# Patient Record
Sex: FEMALE | Race: BLACK OR AFRICAN AMERICAN | Employment: FULL TIME | URBAN - METROPOLITAN AREA
[De-identification: names, ages, dates, MRNs, and addresses within clinical notes are randomized per-mention and may not be internally consistent; named-entity substitution may affect disease eponyms.]

---

## 2017-02-17 ENCOUNTER — HOSPITAL ENCOUNTER (EMERGENCY)
Age: 27
Discharge: HOME | End: 2017-02-18
Attending: EMERGENCY MEDICINE
Payer: COMMERCIAL

## 2017-02-17 DIAGNOSIS — R11.0 NAUSEA ALONE: ICD-10-CM

## 2017-02-17 DIAGNOSIS — K59.00 CONSTIPATION, UNSPECIFIED CONSTIPATION TYPE: ICD-10-CM

## 2017-02-17 DIAGNOSIS — J06.9 ACUTE UPPER RESPIRATORY INFECTION: Primary | ICD-10-CM

## 2017-02-17 DIAGNOSIS — R10.13 ACUTE EPIGASTRIC PAIN: ICD-10-CM

## 2017-02-17 PROCEDURE — 99284 EMERGENCY DEPT VISIT MOD MDM: CPT | Performed by: EMERGENCY MEDICINE

## 2017-02-17 PROCEDURE — 87880 STREP A ASSAY W/OPTIC: CPT | Performed by: EMERGENCY MEDICINE

## 2017-02-17 PROCEDURE — 87804 INFLUENZA ASSAY W/OPTIC: CPT | Performed by: EMERGENCY MEDICINE

## 2017-02-17 PROCEDURE — 87081 CULTURE SCREEN ONLY: CPT | Performed by: EMERGENCY MEDICINE

## 2017-02-17 PROCEDURE — 81003 URINALYSIS AUTO W/O SCOPE: CPT | Performed by: EMERGENCY MEDICINE

## 2017-02-17 NOTE — LETTER
[address] [facility] EMERGENCY DEPT One [address] [address] 
[phone] Work/School Note Date: 2/17/2017 To Whom It May concern: 
 
Mulugeta Abad was seen and treated today in the emergency room by the following provider(s): 
Attending Provider: Tenzin Elizabeth MD. Mulugeta Abad may return to work on 02/19/2017 & when fever free for 24 hours.  
 
Sincerely, 
 
 
 
 
Bradley Santamaria RN

## 2017-02-18 VITALS
BODY MASS INDEX: 32.14 KG/M2 | HEIGHT: 66 IN | RESPIRATION RATE: 16 BRPM | SYSTOLIC BLOOD PRESSURE: 131 MMHG | HEART RATE: 83 BPM | DIASTOLIC BLOOD PRESSURE: 52 MMHG | WEIGHT: 200 LBS | OXYGEN SATURATION: 100 % | TEMPERATURE: 98.4 F

## 2017-02-18 LAB
ALBUMIN SERPL BCP-MCNC: 3.6 G/DL (ref 3.5–5)
ALBUMIN/GLOB SERPL: 0.9 {RATIO} (ref 1.2–3.5)
ALP SERPL-CCNC: 54 U/L (ref 50–136)
ALT SERPL-CCNC: 25 U/L (ref 12–65)
ANION GAP BLD CALC-SCNC: 7 MMOL/L (ref 7–16)
AST SERPL W P-5'-P-CCNC: 16 U/L (ref 15–37)
BASOPHILS # BLD AUTO: 0 K/UL (ref 0–0.2)
BASOPHILS # BLD: 0 % (ref 0–2)
BILIRUB SERPL-MCNC: 0.2 MG/DL (ref 0.2–1.1)
BUN SERPL-MCNC: 7 MG/DL (ref 6–23)
CALCIUM SERPL-MCNC: 9.2 MG/DL (ref 8.3–10.4)
CHLORIDE SERPL-SCNC: 106 MMOL/L (ref 98–107)
CO2 SERPL-SCNC: 29 MMOL/L (ref 21–32)
CREAT SERPL-MCNC: 0.8 MG/DL (ref 0.6–1)
DEPRECATED S PYO AG THROAT QL EIA: NEGATIVE
DIFFERENTIAL METHOD BLD: ABNORMAL
EOSINOPHIL # BLD: 0.2 K/UL (ref 0–0.8)
EOSINOPHIL NFR BLD: 2 % (ref 0.5–7.8)
ERYTHROCYTE [DISTWIDTH] IN BLOOD BY AUTOMATED COUNT: 14 % (ref 11.9–14.6)
FLUAV AG NPH QL IA: NEGATIVE
FLUBV AG NPH QL IA: NEGATIVE
GLOBULIN SER CALC-MCNC: 4 G/DL (ref 2.3–3.5)
GLUCOSE SERPL-MCNC: 86 MG/DL (ref 65–100)
HCG UR QL: NEGATIVE
HCT VFR BLD AUTO: 36.4 % (ref 35.8–46.3)
HETEROPH AB SER QL: NEGATIVE
HGB BLD-MCNC: 11.5 G/DL (ref 11.7–15.4)
IMM GRANULOCYTES # BLD: 0.1 K/UL (ref 0–0.5)
IMM GRANULOCYTES NFR BLD AUTO: 0.6 % (ref 0–5)
LIPASE SERPL-CCNC: 145 U/L (ref 73–393)
LYMPHOCYTES # BLD AUTO: 29 % (ref 13–44)
LYMPHOCYTES # BLD: 2.6 K/UL (ref 0.5–4.6)
MCH RBC QN AUTO: 25.3 PG (ref 26.1–32.9)
MCHC RBC AUTO-ENTMCNC: 31.6 G/DL (ref 31.4–35)
MCV RBC AUTO: 80.2 FL (ref 79.6–97.8)
MONOCYTES # BLD: 0.7 K/UL (ref 0.1–1.3)
MONOCYTES NFR BLD AUTO: 8 % (ref 4–12)
NEUTS SEG # BLD: 5.4 K/UL (ref 1.7–8.2)
NEUTS SEG NFR BLD AUTO: 60 % (ref 43–78)
PLATELET # BLD AUTO: 480 K/UL (ref 150–450)
PMV BLD AUTO: 9.1 FL (ref 10.8–14.1)
POTASSIUM SERPL-SCNC: 3.8 MMOL/L (ref 3.5–5.1)
PROT SERPL-MCNC: 7.6 G/DL (ref 6.3–8.2)
RBC # BLD AUTO: 4.54 M/UL (ref 4.05–5.25)
SODIUM SERPL-SCNC: 142 MMOL/L (ref 136–145)
WBC # BLD AUTO: 9 K/UL (ref 4.3–11.1)

## 2017-02-18 PROCEDURE — 74011250637 HC RX REV CODE- 250/637: Performed by: EMERGENCY MEDICINE

## 2017-02-18 PROCEDURE — 86308 HETEROPHILE ANTIBODY SCREEN: CPT | Performed by: EMERGENCY MEDICINE

## 2017-02-18 PROCEDURE — 80053 COMPREHEN METABOLIC PANEL: CPT | Performed by: EMERGENCY MEDICINE

## 2017-02-18 PROCEDURE — 85025 COMPLETE CBC W/AUTO DIFF WBC: CPT | Performed by: EMERGENCY MEDICINE

## 2017-02-18 PROCEDURE — 81025 URINE PREGNANCY TEST: CPT

## 2017-02-18 PROCEDURE — 83690 ASSAY OF LIPASE: CPT | Performed by: EMERGENCY MEDICINE

## 2017-02-18 RX ORDER — HYDROCODONE BITARTRATE AND HOMATROPINE METHYLBROMIDE ORAL SOLUTION 5; 1.5 MG/5ML; MG/5ML
5 LIQUID ORAL
Qty: 60 ML | Refills: 0 | Status: SHIPPED | OUTPATIENT
Start: 2017-02-18 | End: 2017-02-21 | Stop reason: ALTCHOICE

## 2017-02-18 RX ADMIN — Medication 30 ML: at 00:58

## 2017-02-18 NOTE — ED TRIAGE NOTES
PT arrived to ED c/o a cold symptoms. PT states she was seen at the ER last week and was negative for sore throat. PT states she has been nauseous and unable to eat.

## 2017-02-18 NOTE — DISCHARGE INSTRUCTIONS
Tylenol or ibuprofen for any aches or fever. Extra fluids. Saline gargles or Chloraseptic. Liquid medication to help with cough and achiness and congestion. He can make constipation worse so consider using Dulcolax as well as a stool softener. Recheck with her doctor 2-3 days if not improving. Recheck sooner for worse pain/fever/vomiting/breathing         Abdominal Pain: Care Instructions  Your Care Instructions    Abdominal pain has many possible causes. Some aren't serious and get better on their own in a few days. Others need more testing and treatment. If your pain continues or gets worse, you need to be rechecked and may need more tests to find out what is wrong. You may need surgery to correct the problem. Don't ignore new symptoms, such as fever, nausea and vomiting, urination problems, pain that gets worse, and dizziness. These may be signs of a more serious problem. Your doctor may have recommended a follow-up visit in the next 8 to 12 hours. If you are not getting better, you may need more tests or treatment. The doctor has checked you carefully, but problems can develop later. If you notice any problems or new symptoms, get medical treatment right away. Follow-up care is a key part of your treatment and safety. Be sure to make and go to all appointments, and call your doctor if you are having problems. It's also a good idea to know your test results and keep a list of the medicines you take. How can you care for yourself at home? · Rest until you feel better. · To prevent dehydration, drink plenty of fluids, enough so that your urine is light yellow or clear like water. Choose water and other caffeine-free clear liquids until you feel better. If you have kidney, heart, or liver disease and have to limit fluids, talk with your doctor before you increase the amount of fluids you drink. · If your stomach is upset, eat mild foods, such as rice, dry toast or crackers, bananas, and applesauce. Try eating several small meals instead of two or three large ones. · Wait until 48 hours after all symptoms have gone away before you have spicy foods, alcohol, and drinks that contain caffeine. · Do not eat foods that are high in fat. · Avoid anti-inflammatory medicines such as aspirin, ibuprofen (Advil, Motrin), and naproxen (Aleve). These can cause stomach upset. Talk to your doctor if you take daily aspirin for another health problem. When should you call for help? Call 911 anytime you think you may need emergency care. For example, call if:  · You passed out (lost consciousness). · You pass maroon or very bloody stools. · You vomit blood or what looks like coffee grounds. · You have new, severe belly pain. Call your doctor now or seek immediate medical care if:  · Your pain gets worse, especially if it becomes focused in one area of your belly. · You have a new or higher fever. · Your stools are black and look like tar, or they have streaks of blood. · You have unexpected vaginal bleeding. · You have symptoms of a urinary tract infection. These may include:  ¨ Pain when you urinate. ¨ Urinating more often than usual.  ¨ Blood in your urine. · You are dizzy or lightheaded, or you feel like you may faint. Watch closely for changes in your health, and be sure to contact your doctor if:  · You are not getting better after 1 day (24 hours). Where can you learn more? Go to http://breana-mikhail.info/. Enter S271 in the search box to learn more about \"Abdominal Pain: Care Instructions. \"  Current as of: May 27, 2016  Content Version: 11.1  © 8937-0218 [employer]. Care instructions adapted under license by [employer] (which disclaims liability or warranty for this information).  If you have questions about a medical condition or this instruction, always ask your healthcare professional. [address] any warranty or liability for your use of this information. Constipation: Care Instructions  Your Care Instructions  Constipation means that you have a hard time passing stools (bowel movements). People pass stools from 3 times a day to once every 3 days. What is normal for you may be different. Constipation may occur with pain in the rectum and cramping. The pain may get worse when you try to pass stools. Sometimes there are small amounts of bright red blood on toilet paper or the surface of stools. This is because of enlarged veins near the rectum (hemorrhoids). A few changes in your diet and lifestyle may help you avoid ongoing constipation. Your doctor may also prescribe medicine to help loosen your stool. Some medicines can cause constipation. These include pain medicines and antidepressants. Tell your doctor about all the medicines you take. Your doctor may want to make a medicine change to ease your symptoms. Follow-up care is a key part of your treatment and safety. Be sure to make and go to all appointments, and call your doctor if you are having problems. It's also a good idea to know your test results and keep a list of the medicines you take. How can you care for yourself at home? · Drink plenty of fluids, enough so that your urine is light yellow or clear like water. If you have kidney, heart, or liver disease and have to limit fluids, talk with your doctor before you increase the amount of fluids you drink. · Include high-fiber foods in your diet each day. These include fruits, vegetables, beans, and whole grains. · Get at least 30 minutes of exercise on most days of the week. Walking is a good choice. You also may want to do other activities, such as running, swimming, cycling, or playing tennis or team sports. · Take a fiber supplement, such as Citrucel or Metamucil, every day. Read and follow all instructions on the label. · Schedule time each day for a bowel movement. A daily routine may help.  Take your time having your bowel movement. · Support your feet with a small step stool when you sit on the toilet. This helps flex your hips and places your pelvis in a squatting position. · Your doctor may recommend an over-the-counter laxative to relieve your constipation. Examples are Milk of Magnesia and MiraLax. Read and follow all instructions on the label. Do not use laxatives on a long-term basis. When should you call for help? Call your doctor now or seek immediate medical care if:  · You have new or worse belly pain. · You have new or worse nausea or vomiting. · You have blood in your stools. Watch closely for changes in your health, and be sure to contact your doctor if:  · Your constipation is getting worse. · You do not get better as expected. Where can you learn more? Go to http://breana-mikhail.info/. Enter [phone] in the search box to learn more about \"Constipation: Care Instructions. \"  Current as of: May 27, 2016  Content Version: 11.1  © 1245-7241 [employer]. Care instructions adapted under license by [email or web address] (which disclaims liability or warranty for this information). If you have questions about a medical condition or this instruction, always ask your healthcare professional. [place] [address] any warranty or liability for your use of this information. Sore Throat: Care Instructions  Your Care Instructions    Infection by bacteria or a virus causes most sore throats. Cigarette smoke, dry air, air pollution, allergies, and yelling can also cause a sore throat. Sore throats can be painful and annoying. Fortunately, most sore throats go away on their own. If you have a bacterial infection, your doctor may prescribe antibiotics. Follow-up care is a key part of your treatment and safety. Be sure to make and go to all appointments, and call your doctor if you are having problems.  It's also a good idea to know your test results and keep a list of the medicines you take. How can you care for yourself at home? · If your doctor prescribed antibiotics, take them as directed. Do not stop taking them just because you feel better. You need to take the full course of antibiotics. · Gargle with warm salt water once an hour to help reduce swelling and relieve discomfort. Use 1 teaspoon of salt mixed in 1 cup of warm water. · Take an over-the-counter pain medicine, such as acetaminophen (Tylenol), ibuprofen (Advil, Motrin), or naproxen (Aleve). Read and follow all instructions on the label. · Be careful when taking over-the-counter cold or flu medicines and Tylenol at the same time. Many of these medicines have acetaminophen, which is Tylenol. Read the labels to make sure that you are not taking more than the recommended dose. Too much acetaminophen (Tylenol) can be harmful. · Drink plenty of fluids. Fluids may help soothe an irritated throat. Hot fluids, such as tea or soup, may help decrease throat pain. · Use over-the-counter throat lozenges to soothe pain. Regular cough drops or hard candy may also help. These should not be given to young children because of the risk of choking. · Do not smoke or allow others to smoke around you. If you need help quitting, talk to your doctor about stop-smoking programs and medicines. These can increase your chances of quitting for good. · Use a vaporizer or humidifier to add moisture to your bedroom. Follow the directions for cleaning the machine. When should you call for help? Call your doctor now or seek immediate medical care if:  · You have new or worse trouble swallowing. · Your sore throat gets much worse on one side. Watch closely for changes in your health, and be sure to contact your doctor if you do not get better as expected. Where can you learn more? Go to http://breana-mikhail.info/. Enter [phone] in the search box to learn more about \"Sore Throat: Care Instructions. \"  Current as of: July 29, 2016  Content Version: 11.1  © 5692-7168 [employer]. Care instructions adapted under license by [employer] (which disclaims liability or warranty for this information). If you have questions about a medical condition or this instruction, always ask your healthcare professional. [address] any warranty or liability for your use of this information.

## 2017-02-18 NOTE — ED PROVIDER NOTES
HPI Comments: 78-year-old female with 5 day history of sore throat. Also some URI symptoms congestion. She is also had some epigastric pain and cramping and no bowel movement in the last 5 days. States seen in another emergency department evidently had negative stress test.  She's had nausea but no vomiting. No dysuria    Patient is a 27 y.o. female presenting with sore throat. The history is provided by the patient. Sore Throat    This is a new problem. The current episode started more than 2 days ago. The problem has not changed since onset. Patient reports a subjective fever - was not measured. Associated symptoms include cough. Pertinent negatives include no diarrhea, no vomiting, no ear pain, no headaches and no shortness of breath. She has tried nothing for the symptoms. Past Medical History:   Diagnosis Date    Ill-defined condition      Heart murmur    Prediabetes        No past surgical history on file. No family history on file. Social History     Social History    Marital status: SINGLE     Spouse name: N/A    Number of children: N/A    Years of education: N/A     Occupational History    Not on file. Social History Main Topics    Smoking status: Never Smoker    Smokeless tobacco: Never Used    Alcohol use No    Drug use: No    Sexual activity: Yes     Birth control/ protection: Injection      Comment: depo-provera     Other Topics Concern    Not on file     Social History Narrative         ALLERGIES: Bee sting [sting, bee]    Review of Systems   Constitutional: Positive for chills and fever. HENT: Positive for sore throat. Negative for ear pain. Respiratory: Positive for cough. Negative for shortness of breath. Cardiovascular: Negative for chest pain and palpitations. Gastrointestinal: Positive for abdominal pain, constipation and nausea. Negative for blood in stool, diarrhea and vomiting. Genitourinary: Negative for dysuria and flank pain.    Musculoskeletal: Negative for back pain and neck pain. Skin: Negative for color change and rash. Neurological: Negative for syncope and headaches. All other systems reviewed and are negative. Vitals:    02/17/17 2338   BP: 138/56   Pulse: 95   Resp: 18   Temp: 98.1 °F (36.7 °C)   SpO2: 100%   Weight: 90.7 kg (200 lb)   Height: 5' 6\" (1.676 m)            Physical Exam   Constitutional: She is oriented to person, place, and time. She appears well-developed and well-nourished. No distress. HENT:   Head: Normocephalic and atraumatic. Mouth/Throat: Posterior oropharyngeal erythema present. No oropharyngeal exudate or posterior oropharyngeal edema. Eyes: Conjunctivae and EOM are normal. Pupils are equal, round, and reactive to light. Neck: Normal range of motion. Neck supple. Cardiovascular: Normal rate, regular rhythm and intact distal pulses. No murmur heard. Pulmonary/Chest: Breath sounds normal. No respiratory distress. Abdominal: Soft. Bowel sounds are normal. She exhibits no mass. There is tenderness in the epigastric area and suprapubic area. There is no rebound, no guarding, no tenderness at McBurney's point and negative Domingo's sign. No hernia. Neurological: She is alert and oriented to person, place, and time. Gait normal.   Nl speech   Skin: Skin is warm and dry. Psychiatric: She has a normal mood and affect. Her speech is normal.   Nursing note and vitals reviewed. MDM  Number of Diagnoses or Management Options  Diagnosis management comments: Symptoms sound viral but we'll screen for influenza and strep. Check urinalysis for UTI. Routine blood work.        Amount and/or Complexity of Data Reviewed  Clinical lab tests: ordered and reviewed    Risk of Complications, Morbidity, and/or Mortality  Presenting problems: moderate  Diagnostic procedures: low  Management options: moderate    Patient Progress  Patient progress: stable    ED Course       Procedures    Results Include:    Recent Results (from the past 24 hour(s))   INFLUENZA A & B AG (RAPID TEST)    Collection Time: 02/17/17 11:40 PM   Result Value Ref Range    Influenza A Ag NEGATIVE  NEG      Influenza B Ag NEGATIVE  NEG     STREP AG SCREEN, GROUP A    Collection Time: 02/17/17 11:40 PM   Result Value Ref Range    Group A Strep Ag ID NEGATIVE  NEG     CBC WITH AUTOMATED DIFF    Collection Time: 02/18/17  1:02 AM   Result Value Ref Range    WBC 9.0 4.3 - 11.1 K/uL    RBC 4.54 4.05 - 5.25 M/uL    HGB 11.5 (L) 11.7 - 15.4 g/dL    HCT 36.4 35.8 - 46.3 %    MCV 80.2 79.6 - 97.8 FL    MCH 25.3 (L) 26.1 - 32.9 PG    MCHC 31.6 31.4 - 35.0 g/dL    RDW 14.0 11.9 - 14.6 %    PLATELET 630 (H) 885 - 450 K/uL    MPV 9.1 (L) 10.8 - 14.1 FL    DF AUTOMATED      NEUTROPHILS 60 43 - 78 %    LYMPHOCYTES 29 13 - 44 %    MONOCYTES 8 4.0 - 12.0 %    EOSINOPHILS 2 0.5 - 7.8 %    BASOPHILS 0 0.0 - 2.0 %    IMMATURE GRANULOCYTES 0.6 0.0 - 5.0 %    ABS. NEUTROPHILS 5.4 1.7 - 8.2 K/UL    ABS. LYMPHOCYTES 2.6 0.5 - 4.6 K/UL    ABS. MONOCYTES 0.7 0.1 - 1.3 K/UL    ABS. EOSINOPHILS 0.2 0.0 - 0.8 K/UL    ABS. BASOPHILS 0.0 0.0 - 0.2 K/UL    ABS. IMM. GRANS. 0.1 0.0 - 0.5 K/UL   METABOLIC PANEL, COMPREHENSIVE    Collection Time: 02/18/17  1:02 AM   Result Value Ref Range    Sodium 142 136 - 145 mmol/L    Potassium 3.8 3.5 - 5.1 mmol/L    Chloride 106 98 - 107 mmol/L    CO2 29 21 - 32 mmol/L    Anion gap 7 7 - 16 mmol/L    Glucose 86 65 - 100 mg/dL    BUN 7 6 - 23 MG/DL    Creatinine 0.80 0.6 - 1.0 MG/DL    GFR est AA >60 >60 ml/min/1.73m2    GFR est non-AA >60 >60 ml/min/1.73m2    Calcium 9.2 8.3 - 10.4 MG/DL    Bilirubin, total 0.2 0.2 - 1.1 MG/DL    ALT (SGPT) 25 12 - 65 U/L    AST (SGOT) 16 15 - 37 U/L    Alk.  phosphatase 54 50 - 136 U/L    Protein, total 7.6 6.3 - 8.2 g/dL    Albumin 3.6 3.5 - 5.0 g/dL    Globulin 4.0 (H) 2.3 - 3.5 g/dL    A-G Ratio 0.9 (L) 1.2 - 3.5     LIPASE    Collection Time: 02/18/17  1:02 AM   Result Value Ref Range    Lipase 145 73 - 393 U/L HCG URINE, QL. - POC    Collection Time: 02/18/17  1:24 AM   Result Value Ref Range    Pregnancy test,urine (POC) NEGATIVE  NEG         UA negative

## 2017-02-18 NOTE — ED NOTES
I have reviewed medications, follow up provider options, and discharge instructions with the patient. The patient verbalized understanding. Copy of discharge information given to patient upon discharge. Prescription(s) given to patient. Patient discharged in no distress. Patient instructed not to drive while under influence of drowsy drugs. Patient ambulatory to waiting area. No questions at this time.

## 2017-02-20 LAB
BACTERIA SPEC CULT: NORMAL
SERVICE CMNT-IMP: NORMAL

## 2017-03-15 PROBLEM — Z30.013 ENCOUNTER FOR INITIAL PRESCRIPTION OF INJECTABLE CONTRACEPTIVE: Status: ACTIVE | Noted: 2017-03-15

## 2017-08-17 ENCOUNTER — HOSPITAL ENCOUNTER (EMERGENCY)
Age: 27
Discharge: HOME | End: 2017-08-17
Attending: EMERGENCY MEDICINE
Payer: SELF-PAY

## 2017-08-17 VITALS
DIASTOLIC BLOOD PRESSURE: 82 MMHG | TEMPERATURE: 98.3 F | WEIGHT: 217 LBS | RESPIRATION RATE: 16 BRPM | HEART RATE: 88 BPM | OXYGEN SATURATION: 100 % | SYSTOLIC BLOOD PRESSURE: 135 MMHG | HEIGHT: 66 IN | BODY MASS INDEX: 34.87 KG/M2

## 2017-08-17 DIAGNOSIS — H00.012 HORDEOLUM EXTERNUM OF RIGHT LOWER EYELID: Primary | ICD-10-CM

## 2017-08-17 PROCEDURE — 99283 EMERGENCY DEPT VISIT LOW MDM: CPT | Performed by: EMERGENCY MEDICINE

## 2017-08-17 NOTE — ED TRIAGE NOTES
Pt arrived ambulatory via POV with c/o R eye swelling and pain X3 days. Pt's mother plucked hair out of lower lid on Monday.

## 2017-08-18 NOTE — ED PROVIDER NOTES
HPI Comments: Patient is a 73[facility] and [facility] of the right lower eyelid for 3 days. She states her mother pulled on eyelash from that area a few days ago. Today the swelling is worse. She denies any vision changes. She denies any drainage or pus discharge. Patient is a 32 y.o. female presenting with eye pain. The history is provided by the patient. Eye Pain    This is a new problem. The current episode started more than 2 days ago. The problem occurs constantly. The problem has been gradually worsening. The right eye is affected. Injury mechanism: pulled an eyelash from the right lower lid. There is no known exposure to pink eye. She does not wear contacts. Associated symptoms include pain. Pertinent negatives include no blurred vision, no decreased vision, no discharge, no foreign body sensation, no photophobia and no eye redness. She has tried nothing for the symptoms. Past Medical History:   Diagnosis Date    Anemia     Chronic pain     Ill-defined condition     Heart murmur    Irritable bowel syndrome with constipation     Overactive bladder     Prediabetes     Prediabetes        Past Surgical History:   Procedure Laterality Date    HX COLONOSCOPY  04/2017    hemorrhoids    HX ENDOSCOPY  04/2017         Family History:   Problem Relation Age of Onset    Hypertension Mother     Asthma Mother     No Known Problems Father     Diabetes Maternal Aunt     Diabetes Paternal Aunt     Diabetes Paternal Grandmother        Social History     Social History    Marital status: SINGLE     Spouse name: N/A    Number of children: N/A    Years of education: N/A     Occupational History    Not on file.      Social History Main Topics    Smoking status: Never Smoker    Smokeless tobacco: Never Used    Alcohol use No    Drug use: No    Sexual activity: Yes     Birth control/ protection: Injection      Comment: depo-provera Other Topics Concern    Not on file     Social History Narrative         ALLERGIES: Bee sting [sting, bee]    Review of Systems   Constitutional: Negative. HENT: Negative. Eyes: Positive for pain. Negative for blurred vision, photophobia, discharge and redness. Respiratory: Negative. Cardiovascular: Negative. Gastrointestinal: Negative. Endocrine: Negative. Genitourinary: Negative. Musculoskeletal: Negative. Vitals:    08/17/17 1909   BP: 143/82   Pulse: 88   Resp: 18   Temp: 98.3 °F (36.8 °C)   SpO2: 99%   Weight: 98.4 kg (217 lb)   Height: 5' 6\" (1.676 m)            Physical Exam   Constitutional: She appears well-developed and well-nourished. HENT:   Head: Normocephalic and atraumatic. Eyes: Conjunctivae and EOM are normal. Pupils are equal, round, and reactive to light. Swelling to the right lower eyelid with mild tenderness   Neck: Normal range of motion. Neck supple. Lymphadenopathy:     She has no cervical adenopathy. Skin: Skin is warm and dry. No rash noted. Nursing note and vitals reviewed. MDM  Number of Diagnoses or Management Options  Diagnosis management comments: Differential diagnosis includes stye, conjunctivitis, abscess    Risk of Complications, Morbidity, and/or Mortality  Presenting problems: minimal  Diagnostic procedures: minimal  Management options: minimal    Patient Progress  Patient progress: stable    ED Course   8:47 PM  I have advised the patient to continue with Tylenol or ibuprofen for pain and apply warm compresses every few hours to the affected area. Voice dictation software was used during the making of this note. This software is not perfect and grammatical and other typographical errors may be present. This note has been proofread, but may still contain errors.   Jamee Arellano MD; 8/17/2017 @8:48 PM   ===================================================================        Procedures

## 2017-08-18 NOTE — DISCHARGE INSTRUCTIONS
Styes and Chalazia: Care Instructions  Your Care Instructions    Styes and chalazia (say \"nob-XFO-fxl-uh\") are both conditions that can cause swelling of the eyelid. A stye is an infection in the root of an eyelash. The infection causes a tender red lump on the edge of the eyelid. The infection can spread until the whole eyelid becomes red and inflamed. Styes usually break open, and a tiny amount of pus drains. They usually clear up on their own in about a week, but they sometimes need treatment with antibiotics. A chalazion is a lump or cyst in the eyelid (chalazion is singular; chalazia is plural). It is caused by swelling and inflammation of deep oil glands inside the eyelid. Chalazia are usually not infected. They can take a few months to heal.  If a chalazion becomes more swollen and painful or does not go away, you may need to have it drained by your doctor. Follow-up care is a key part of your treatment and safety. Be sure to make and go to all appointments, and call your doctor if you are having problems. It's also a good idea to know your test results and keep a list of the medicines you take. How can you care for yourself at home? · Do not rub your eyes. Do not squeeze or try to open a stye or chalazion. · To help a stye or chalazion heal faster:  ¨ Put a warm, moist compress on your eye for 5 to 10 minutes, 3 to 6 times a day. Heat often brings a stye to a point where it drains on its own. Keep in mind that warm compresses will often increase swelling a little at first.  ¨ Do not use hot water or heat a wet cloth in a microwave oven. The compress may get too hot and can burn the eyelid. · Always wash your hands before and after you use a compress or touch your eyes. · If the doctor gave you antibiotic drops or ointment, use the medicine exactly as directed. Use the medicine for as long as instructed, even if your eye starts to feel better.   · To put in eyedrops or ointment:  ¨ Tilt your head back, and pull your lower eyelid down with one finger. ¨ Drop or squirt the medicine inside the lower lid. ¨ Close your eye for 30 to 60 seconds to let the drops or ointment move around. ¨ Do not touch the ointment or dropper tip to your eyelashes or any other surface. · Do not wear eye makeup or contact lenses until the stye or chalazion heals. · Do not share towels, pillows, or washcloths while you have a stye. When should you call for help? Call your doctor now or seek immediate medical care if:  · You have pain in your eye. · You have a change in vision or loss of vision. · Redness and swelling get much worse. Watch closely for changes in your health, and be sure to contact your doctor if:  · Your stye does not get better in 1 week. · Your chalazion does not start to get better after several weeks. Where can you learn more? Go to http://breana-mikhail.info/. Enter F900 in the search box to learn more about \"Styes and Chalazia: Care Instructions. \"  Current as of: March 3, 2017  Content Version: 11.3  © 7355-7129 [employer]. Care instructions adapted under license by [employer] (which disclaims liability or warranty for this information). If you have questions about a medical condition or this instruction, always ask your healthcare professional. [address] any warranty or liability for your use of this information.

## 2019-08-02 ENCOUNTER — HOSPITAL ENCOUNTER (EMERGENCY)
Age: 29
Discharge: HOME | End: 2019-08-02
Attending: EMERGENCY MEDICINE
Payer: SELF-PAY

## 2019-08-02 VITALS
OXYGEN SATURATION: 96 % | SYSTOLIC BLOOD PRESSURE: 166 MMHG | HEART RATE: 103 BPM | TEMPERATURE: 98.4 F | BODY MASS INDEX: 36.65 KG/M2 | HEIGHT: 65 IN | DIASTOLIC BLOOD PRESSURE: 99 MMHG | WEIGHT: 220 LBS | RESPIRATION RATE: 16 BRPM

## 2019-08-02 DIAGNOSIS — T63.484A INSECT STINGS, UNDETERMINED INTENT, INITIAL ENCOUNTER: Primary | ICD-10-CM

## 2019-08-02 PROCEDURE — 74011250637 HC RX REV CODE- 250/637: Performed by: PHYSICIAN ASSISTANT

## 2019-08-02 PROCEDURE — 99283 EMERGENCY DEPT VISIT LOW MDM: CPT | Performed by: PHYSICIAN ASSISTANT

## 2019-08-02 RX ORDER — TRIAMCINOLONE ACETONIDE 1 MG/G
OINTMENT TOPICAL 2 TIMES DAILY
Qty: 80 G | Refills: 0 | Status: SHIPPED | OUTPATIENT
Start: 2019-08-02 | End: 2019-12-17

## 2019-08-02 RX ORDER — METHYLPREDNISOLONE 4 MG/1
4 TABLET ORAL
Qty: 1 DOSE PACK | Refills: 0 | Status: SHIPPED | OUTPATIENT
Start: 2019-08-02 | End: 2019-08-09

## 2019-08-02 RX ORDER — DEXAMETHASONE SODIUM PHOSPHATE 100 MG/10ML
10 INJECTION INTRAMUSCULAR; INTRAVENOUS
Status: COMPLETED | OUTPATIENT
Start: 2019-08-02 | End: 2019-08-02

## 2019-08-02 RX ORDER — MINERAL OIL
180 ENEMA (ML) RECTAL DAILY
Qty: 30 TAB | Refills: 0 | Status: SHIPPED | OUTPATIENT
Start: 2019-08-02 | End: 2019-12-17

## 2019-08-02 RX ADMIN — DEXAMETHASONE SODIUM PHOSPHATE 10 MG: 10 INJECTION INTRAMUSCULAR; INTRAVENOUS at 18:43

## 2019-08-02 NOTE — ED PROVIDER NOTES
Patient states she was at work when she felt something sting her left upper arm. She saw a yellow jacket and swatted it off of her arm. She states it has been burning since then. This was about an hour ago. She did drink some Benadryl and came to the emergency room. She states she had a reaction to something that stung her when she was 8years old and has been stung a couple of times since then but never has had a reaction. She is not having any swelling of her tongue or her throat, trouble breathing, chest pain, rash, dizziness or other new symptoms. She did ambulate to the room without difficulty and is well-hydrated. She is not diabetic. She states it burns. The history is provided by the patient. Bee sting    The incident occurred just prior to arrival. The incident occurred at work. There is an injury to the left upper arm. The pain is mild. Pertinent negatives include no chest pain, no fussiness, no numbness, no visual disturbance, no abdominal pain, no bowel incontinence, no nausea, no vomiting, no bladder incontinence, no headaches, no hearing loss, no inability to bear weight, no neck pain, no pain when bearing weight, no focal weakness, no decreased responsiveness, no light-headedness, no loss of consciousness, no seizures, no tingling, no weakness, no cough, no difficulty breathing and no memory loss. There have been no prior injuries to these areas. She is right-handed. She has been behaving normally. There were no sick contacts.         Past Medical History:   Diagnosis Date    Anemia     Chronic pain     Ill-defined condition     Heart murmur    Irritable bowel syndrome with constipation     Overactive bladder     Prediabetes     Prediabetes        Past Surgical History:   Procedure Laterality Date    HX COLONOSCOPY  04/2017    hemorrhoids    HX ENDOSCOPY  04/2017         Family History:   Problem Relation Age of Onset    Hypertension Mother     Asthma Mother     No Known Problems Father     Diabetes Maternal Aunt     Diabetes Paternal Aunt     Diabetes Paternal Grandmother        Social History     Socioeconomic History    Marital status: SINGLE     Spouse name: Not on file    Number of children: Not on file    Years of education: Not on file    Highest education level: Not on file   Occupational History    Not on file   Social Needs    Financial resource strain: Not on file    Food insecurity:     Worry: Not on file     Inability: Not on file    Transportation needs:     Medical: Not on file     Non-medical: Not on file   Tobacco Use    Smoking status: Never Smoker    Smokeless tobacco: Never Used   Substance and Sexual Activity    Alcohol use: No    Drug use: No    Sexual activity: Yes     Birth control/protection: Injection     Comment: depo-provera   Lifestyle    Physical activity:     Days per week: Not on file     Minutes per session: Not on file    Stress: Not on file   Relationships    Social connections:     Talks on phone: Not on file     Gets together: Not on file     Attends Zoroastrian service: Not on file     Active member of club or organization: Not on file     Attends meetings of clubs or organizations: Not on file     Relationship status: Not on file    Intimate partner violence:     Fear of current or ex partner: Not on file     Emotionally abused: Not on file     Physically abused: Not on file     Forced sexual activity: Not on file   Other Topics Concern    Not on file   Social History Narrative    Not on file         ALLERGIES: Bee sting [sting, bee]    Review of Systems   Constitutional: Negative. Negative for decreased responsiveness. HENT: Negative. Negative for hearing loss. Eyes: Negative. Negative for visual disturbance. Respiratory: Negative. Negative for cough. Cardiovascular: Negative. Negative for chest pain. Gastrointestinal: Negative. Negative for abdominal pain, bowel incontinence, nausea and vomiting. Genitourinary: Negative. Negative for bladder incontinence. Musculoskeletal: Negative. Negative for neck pain. Skin: Positive for wound. Neurological: Negative. Negative for tingling, focal weakness, seizures, loss of consciousness, weakness, light-headedness, numbness and headaches. Psychiatric/Behavioral: Negative. Negative for memory loss. All other systems reviewed and are negative. Vitals:    08/02/19 1816   BP: 156/82   Pulse: (!) 117   Resp: 16   Temp: 98.7 °F (37.1 °C)   SpO2: 96%   Weight: 99.8 kg (220 lb)   Height: 5' 5\" (1.651 m)            Physical Exam   Constitutional: She is oriented to person, place, and time. She appears well-developed and well-nourished. HENT:   Head: Normocephalic and atraumatic. Right Ear: External ear normal.   Left Ear: External ear normal.   Nose: Nose normal.   Mouth/Throat: Oropharynx is clear and moist.   Eyes: Pupils are equal, round, and reactive to light. Conjunctivae and EOM are normal.   Neck: Normal range of motion. Neck supple. Cardiovascular: Normal rate, regular rhythm, normal heart sounds and intact distal pulses. Pulmonary/Chest: Effort normal and breath sounds normal.   Abdominal: Soft. Bowel sounds are normal.   Musculoskeletal: Normal range of motion. Arms:  Neurological: She is alert and oriented to person, place, and time. She has normal reflexes. Skin: Skin is warm and dry. Psychiatric: She has a normal mood and affect. Her behavior is normal. Judgment and thought content normal.   Nursing note and vitals reviewed. MDM  Number of Diagnoses or Management Options  Insect stings, undetermined intent, initial encounter:   Risk of Complications, Morbidity, and/or Mortality  Presenting problems: moderate  Diagnostic procedures: moderate  Management options: moderate    Patient Progress  Patient progress: improved         Procedures  The patient was observed in the ED.     Patient was stung on her left upper arm and is having a localized reaction to that. There is no swelling of her tongue, neck or the arm. Her lungs are clear. I have given her a dose of Decadron here and sent her home with a Medrol Dosepak, Fexofenadine tablets and Kenalog cream to apply to the area. She was given an ice pack. There does not appear to be a systemic reaction at this point. She was asked to follow-up with her primary care physician for recheck. I have written a note for work if needed and she should return to the ED if anything is changing. She is stable for discharge at this time and ambulatory out of the ER without difficulty. I discussed the results of all labs, procedures, radiographs, and treatments with the patient and available family. Treatment plan is agreed upon and the patient is ready for discharge. All voiced understanding of the discharge plan and medication instructions or changes as appropriate. Questions about treatment in the ED were answered. All were encouraged to return should symptoms worsen or new problems develop.

## 2019-08-02 NOTE — ED TRIAGE NOTES
Pt ambulatory to triage without complications. 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 Pt states she swatted off but has some mild swelling and redness to the left bicep. Pt states she took some liquid benadryl, but just took a \"swig\". Pt denies throat swelling or SOB.

## 2019-08-02 NOTE — ED NOTES
I have reviewed discharge instructions with the patient. The patient verbalized understanding. Patient left ED via Discharge Method: ambulatory to Home with family. Opportunity for questions and clarification provided. Patient given 3 scripts. Work note provided. No e-sign. To continue your aftercare when you leave the hospital, you may receive an automated call from our care team to check in on how you are doing. This is a free service and part of our promise to provide the best care and service to meet your aftercare needs.  If you have questions, or wish to unsubscribe from this service please call [phone]. Thank you for Choosing our [facility] Emergency Department.

## 2019-08-02 NOTE — DISCHARGE INSTRUCTIONS
Patient Education        Finish all of the Medrol dose pack, use the cream to the area if needed. Take the fexofenadine daily. Insect Stings and Bites: Care Instructions  Your Care Instructions  Stings and bites from bees, wasps, ants, and other insects often cause pain, swelling, redness, and itching. In some people, especially children, the redness and swelling may be worse. It may extend several inches beyond the affected area. But in most cases, stings and bites don't cause reactions all over the body. If you have had a reaction to an insect sting or bite, you are at risk for a reaction if you get stung or bitten again. Follow-up care is a key part of your treatment and safety. Be sure to make and go to all appointments, and call your doctor if you are having problems. It's also a good idea to know your test results and keep a list of the medicines you take. How can you care for yourself at home? · Do not scratch or rub the skin where the sting or bite occurred. · Put a cold pack or ice cube on the area. Put a thin cloth between the ice and your skin. For some people, a paste of baking soda mixed with a little water helps relieve pain and decrease the reaction. · Take an over-the-counter antihistamine, such as diphenhydramine (Benadryl) or loratadine (Claritin), to relieve swelling, redness, and itching. Calamine lotion or hydrocortisone cream may also help. Do not give antihistamines to your child unless you have checked with the doctor first.  · Be safe with medicines. If your doctor prescribed medicine for your allergy, take it exactly as prescribed. Call your doctor if you think you are having a problem with your medicine. You will get more details on the specific medicines your doctor prescribes. · Your doctor may prescribe a shot of epinephrine to carry with you in case you have a severe reaction. Learn how and when to give yourself the shot, and keep it with you at all times.  Make sure it has not . · Go to the emergency room anytime you have a severe reaction. Go even if you have given yourself epinephrine and are feeling better. Symptoms can come back. When should you call for help? Call 911 anytime you think you may need emergency care. For example, call if:    · You have symptoms of a severe allergic reaction. These may include:  ? Sudden raised, red areas (hives) all over your body. ? Swelling of the throat, mouth, lips, or tongue. ? Trouble breathing. ? Passing out (losing consciousness). Or you may feel very lightheaded or suddenly feel weak, confused, or restless.    Call your doctor now or seek immediate medical care if:    · You have symptoms of an allergic reaction not right at the sting or bite, such as:  ? A rash or small area of hives (raised, red areas on the skin). ? Itching. ? Swelling. ? Belly pain, nausea, or vomiting.     · You have a lot of swelling around the site (such as your entire arm or leg is swollen).     · You have signs of infection, such as:  ? Increased pain, swelling, redness, or warmth around the sting. ? Red streaks leading from the area. ? Pus draining from the sting. ? A fever.    Watch closely for changes in your health, and be sure to contact your doctor if:    · You do not get better as expected. Where can you learn more? Go to http://breana-mikhail.info/. Enter P390 in the search box to learn more about \"Insect Stings and Bites: Care Instructions. \"  Current as of: 2018  Content Version: 12.1  © 2252-7428 [employer]. Care instructions adapted under license by [employer] (which disclaims liability or warranty for this information). If you have questions about a medical condition or this instruction, always ask your healthcare professional. [address] any warranty or liability for your use of this information.

## 2019-08-02 NOTE — LETTER
[address] [facility] EMERGENCY DEPT 
[address] [facility] [address] 
[phone] Work/School Note Date: 8/2/2019 To Whom It May concern: 
 
Roger Alonso was seen and treated today in the emergency room by the following provider(s): 
Attending Provider: Valdemar Carmona MD 
Physician Assistant: KARLI Zeng. Roger Alonso may return to work on 08/04/19. Sincerely, KARLI Malhotra

## 2020-11-11 PROBLEM — E66.01 OBESITY, MORBID (HCC): Status: ACTIVE | Noted: 2020-11-11

## 2020-11-23 ENCOUNTER — TRANSCRIBE ORDER (OUTPATIENT)
Dept: SCHEDULING | Age: 30
End: 2020-11-23

## 2020-11-23 DIAGNOSIS — M54.2 NECK PAIN: Primary | ICD-10-CM

## 2020-11-23 DIAGNOSIS — M79.602 LEFT ARM PAIN: ICD-10-CM

## 2020-11-24 ENCOUNTER — TELEPHONE (OUTPATIENT)
Dept: NUTRITION | Age: 30
End: 2020-11-24

## 2020-11-24 NOTE — TELEPHONE ENCOUNTER
Nutrition Counseling: Contacted pt regarding referral. See notes documented in Nutrition Counseling Referral for details. No further follow-up contact from pt. Will close referral for this office.     [address] [facility]  [phone]

## 2020-12-01 ENCOUNTER — HOSPITAL ENCOUNTER (OUTPATIENT)
Dept: MRI IMAGING | Age: 30
Discharge: HOME | End: 2020-12-01
Attending: PHYSICAL MEDICINE & REHABILITATION

## 2020-12-01 DIAGNOSIS — M79.602 LEFT ARM PAIN: ICD-10-CM

## 2020-12-01 DIAGNOSIS — M54.2 NECK PAIN: ICD-10-CM

## 2020-12-02 NOTE — PROGRESS NOTES
Patient unable to tolerate MRI due to claustrophobia, will reschedule after further consult with physician

## 2021-02-11 PROBLEM — M54.50 LOWER BACK PAIN: Status: ACTIVE | Noted: 2019-03-08

## 2021-03-02 PROBLEM — Z79.899 ENCOUNTER FOR MEDICATION MANAGEMENT: Status: ACTIVE | Noted: 2021-03-02

## 2021-03-02 PROBLEM — R20.0 NUMBNESS AND TINGLING OF BOTH FEET: Status: ACTIVE | Noted: 2021-03-02

## 2021-03-02 PROBLEM — R29.898 WEAKNESS OF BOTH LEGS: Status: ACTIVE | Noted: 2021-03-02

## 2021-03-02 PROBLEM — R20.2 NUMBNESS AND TINGLING OF BOTH FEET: Status: ACTIVE | Noted: 2021-03-02

## 2021-03-05 ENCOUNTER — APPOINTMENT (OUTPATIENT)
Dept: NUTRITION | Age: 31
End: 2021-03-05
Attending: PHYSICIAN ASSISTANT

## 2021-03-25 ENCOUNTER — TELEPHONE (OUTPATIENT)
Dept: NUTRITION | Age: 31
End: 2021-03-25

## 2021-03-25 NOTE — TELEPHONE ENCOUNTER
Nutrition Counseling: Contacted pt regarding referral. See notes documented in Nutrition Counseling Referral for details. No further follow-up contact from pt. Will close referral for this office.     [address] [facility]  [phone]

## 2021-04-13 PROBLEM — R20.2 PARESTHESIA OF BOTH HANDS: Status: ACTIVE | Noted: 2021-04-13

## 2021-11-11 PROBLEM — M25.541 JOINT PAIN IN FINGERS OF BOTH HANDS: Status: ACTIVE | Noted: 2021-11-11

## 2021-11-11 PROBLEM — M25.569 JOINT PAIN, KNEE: Status: ACTIVE | Noted: 2021-11-11

## 2021-11-11 PROBLEM — M25.542 JOINT PAIN IN FINGERS OF BOTH HANDS: Status: ACTIVE | Noted: 2021-11-11

## 2021-12-16 PROBLEM — E11.9 TYPE 2 DIABETES MELLITUS (HCC): Status: ACTIVE | Noted: 2021-12-16

## 2022-03-18 PROBLEM — R20.0 NUMBNESS AND TINGLING OF BOTH FEET: Status: ACTIVE | Noted: 2021-03-02

## 2022-03-18 PROBLEM — M25.541 JOINT PAIN IN FINGERS OF BOTH HANDS: Status: ACTIVE | Noted: 2021-11-11

## 2022-03-18 PROBLEM — E66.01 OBESITY, MORBID (HCC): Status: ACTIVE | Noted: 2020-11-11

## 2022-03-18 PROBLEM — R20.2 NUMBNESS AND TINGLING OF BOTH FEET: Status: ACTIVE | Noted: 2021-03-02

## 2022-03-18 PROBLEM — M25.542 JOINT PAIN IN FINGERS OF BOTH HANDS: Status: ACTIVE | Noted: 2021-11-11

## 2022-03-19 PROBLEM — M54.50 LOWER BACK PAIN: Status: ACTIVE | Noted: 2019-03-08

## 2022-03-19 PROBLEM — Z79.899 ENCOUNTER FOR MEDICATION MANAGEMENT: Status: ACTIVE | Noted: 2021-03-02

## 2022-03-19 PROBLEM — M25.569 JOINT PAIN, KNEE: Status: ACTIVE | Noted: 2021-11-11

## 2022-03-19 PROBLEM — R29.898 WEAKNESS OF BOTH LEGS: Status: ACTIVE | Noted: 2021-03-02

## 2022-03-19 PROBLEM — R20.2 PARESTHESIA OF BOTH HANDS: Status: ACTIVE | Noted: 2021-04-13

## 2022-03-20 PROBLEM — E11.9 TYPE 2 DIABETES MELLITUS (HCC): Status: ACTIVE | Noted: 2021-12-16

## 2022-06-10 ENCOUNTER — TELEPHONE (OUTPATIENT)
Dept: OBGYN CLINIC | Age: 32
End: 2022-06-10

## 2022-06-10 NOTE — TELEPHONE ENCOUNTER
Depo-Provera 150mg/mL      Lot Number: [id number]  Expiration: 07/2024  Site given: right gluteus alan IM  [facility]:[id number]  Return Date: 8/26-9/9  Patient tolerated procedure well

## 2022-06-17 RX ORDER — OMEPRAZOLE 20 MG/1
CAPSULE, DELAYED RELEASE ORAL
Qty: 180 CAPSULE | Refills: 0 | Status: SHIPPED | OUTPATIENT
Start: 2022-06-17 | End: 2022-08-26 | Stop reason: SDUPTHER

## 2022-07-06 RX ORDER — AMLODIPINE BESYLATE 2.5 MG/1
TABLET ORAL
Qty: 90 TABLET | Refills: 0 | OUTPATIENT
Start: 2022-07-06

## 2022-07-06 RX ORDER — AMLODIPINE BESYLATE 2.5 MG/1
2.5 TABLET ORAL DAILY
Qty: 30 TABLET | Refills: 2 | Status: SHIPPED | OUTPATIENT
Start: 2022-07-06 | End: 2022-07-29 | Stop reason: SDUPTHER

## 2022-08-01 RX ORDER — TOLTERODINE 4 MG/1
CAPSULE, EXTENDED RELEASE ORAL
Qty: 90 CAPSULE | Refills: 0 | Status: SHIPPED | OUTPATIENT
Start: 2022-08-01

## 2022-08-01 RX ORDER — AMLODIPINE BESYLATE 2.5 MG/1
2.5 TABLET ORAL DAILY
Qty: 30 TABLET | Refills: 2 | Status: SHIPPED | OUTPATIENT
Start: 2022-08-01

## 2022-08-01 RX ORDER — TOLTERODINE 4 MG/1
CAPSULE, EXTENDED RELEASE ORAL
COMMUNITY
Start: 2020-11-03 | End: 2022-08-01 | Stop reason: SDUPTHER

## 2022-08-01 RX ORDER — TOLTERODINE 4 MG/1
CAPSULE, EXTENDED RELEASE ORAL
Qty: 90 CAPSULE | Refills: 1 | Status: SHIPPED | OUTPATIENT
Start: 2022-08-01 | End: 2022-08-26

## 2022-08-05 ENCOUNTER — TELEPHONE (OUTPATIENT)
Dept: OBGYN CLINIC | Age: 32
End: 2022-08-05

## 2022-08-05 NOTE — TELEPHONE ENCOUNTER
Pt calls with c/o bleeding after intercourse. She states that she has dark brown spotting today after intercourse. She is aware that brown can be related to old blood. This could be left over blood from the bleeding that she had last week. She does feel achy during and after intercourse. She does have a an appt with Anna Myers next week. She is advised to abstain from intercourse until she has her appt with Anna Myers next week. All questions answered. Call back prn. Voiced full understanding.

## 2022-08-22 ENCOUNTER — HOSPITAL ENCOUNTER (EMERGENCY)
Age: 32
Discharge: HOME | End: 2022-08-22
Attending: EMERGENCY MEDICINE
Payer: COMMERCIAL

## 2022-08-22 ENCOUNTER — HOSPITAL ENCOUNTER (EMERGENCY)
Dept: GENERAL RADIOLOGY | Age: 32
Discharge: HOME | End: 2022-08-25
Payer: COMMERCIAL

## 2022-08-22 VITALS
DIASTOLIC BLOOD PRESSURE: 103 MMHG | OXYGEN SATURATION: 100 % | TEMPERATURE: 98.5 F | RESPIRATION RATE: 18 BRPM | HEIGHT: 66 IN | HEART RATE: 79 BPM | WEIGHT: 225 LBS | BODY MASS INDEX: 36.16 KG/M2 | SYSTOLIC BLOOD PRESSURE: 152 MMHG

## 2022-08-22 DIAGNOSIS — R07.9 CHEST PAIN, UNSPECIFIED TYPE: Primary | ICD-10-CM

## 2022-08-22 LAB
ALBUMIN SERPL-MCNC: 3.9 G/DL (ref 3.5–5)
ALBUMIN/GLOB SERPL: 1.1 {RATIO} (ref 1.2–3.5)
ALP SERPL-CCNC: 42 U/L (ref 50–136)
ALT SERPL-CCNC: 27 U/L (ref 12–65)
ANION GAP SERPL CALC-SCNC: 3 MMOL/L (ref 7–16)
AST SERPL-CCNC: 17 U/L (ref 15–37)
BILIRUB SERPL-MCNC: 0.3 MG/DL (ref 0.2–1.1)
BUN SERPL-MCNC: 11 MG/DL (ref 6–23)
CALCIUM SERPL-MCNC: 9.4 MG/DL (ref 8.3–10.4)
CHLORIDE SERPL-SCNC: 109 MMOL/L (ref 98–107)
CO2 SERPL-SCNC: 29 MMOL/L (ref 21–32)
CREAT SERPL-MCNC: 0.8 MG/DL (ref 0.6–1)
ERYTHROCYTE [DISTWIDTH] IN BLOOD BY AUTOMATED COUNT: 13.9 % (ref 11.9–14.6)
GLOBULIN SER CALC-MCNC: 3.6 G/DL (ref 2.3–3.5)
GLUCOSE SERPL-MCNC: 83 MG/DL (ref 65–100)
HCG SERPL-ACNC: <1 MIU/ML (ref 0–6)
HCT VFR BLD AUTO: 43.3 % (ref 35.8–46.3)
HGB BLD-MCNC: 13.7 G/DL (ref 11.7–15.4)
LIPASE SERPL-CCNC: 131 U/L (ref 73–393)
MCH RBC QN AUTO: 28.4 PG (ref 26.1–32.9)
MCHC RBC AUTO-ENTMCNC: 31.6 G/DL (ref 31.4–35)
MCV RBC AUTO: 89.8 FL (ref 79.6–97.8)
NRBC # BLD: 0 K/UL (ref 0–0.2)
PLATELET # BLD AUTO: 390 K/UL (ref 150–450)
PMV BLD AUTO: 9.1 FL (ref 9.4–12.3)
POTASSIUM SERPL-SCNC: 3.8 MMOL/L (ref 3.5–5.1)
PROT SERPL-MCNC: 7.5 G/DL (ref 6.3–8.2)
RBC # BLD AUTO: 4.82 M/UL (ref 4.05–5.2)
SODIUM SERPL-SCNC: 141 MMOL/L (ref 136–145)
TROPONIN I SERPL HS-MCNC: 6.4 PG/ML (ref 0–14)
TROPONIN I SERPL HS-MCNC: 6.7 PG/ML (ref 0–14)
WBC # BLD AUTO: 6.9 K/UL (ref 4.3–11.1)

## 2022-08-22 PROCEDURE — 83690 ASSAY OF LIPASE: CPT

## 2022-08-22 PROCEDURE — 99285 EMERGENCY DEPT VISIT HI MDM: CPT

## 2022-08-22 PROCEDURE — 94760 N-INVAS EAR/PLS OXIMETRY 1: CPT

## 2022-08-22 PROCEDURE — 84702 CHORIONIC GONADOTROPIN TEST: CPT

## 2022-08-22 PROCEDURE — 84484 ASSAY OF TROPONIN QUANT: CPT

## 2022-08-22 PROCEDURE — 71046 X-RAY EXAM CHEST 2 VIEWS: CPT

## 2022-08-22 PROCEDURE — 85027 COMPLETE CBC AUTOMATED: CPT

## 2022-08-22 PROCEDURE — 6370000000 HC RX 637 (ALT 250 FOR IP): Performed by: EMERGENCY MEDICINE

## 2022-08-22 PROCEDURE — 80053 COMPREHEN METABOLIC PANEL: CPT

## 2022-08-22 RX ORDER — IBUPROFEN 600 MG/1
600 TABLET ORAL
Status: COMPLETED | OUTPATIENT
Start: 2022-08-22 | End: 2022-08-22

## 2022-08-22 RX ORDER — ACETAMINOPHEN 500 MG
1000 TABLET ORAL
Status: COMPLETED | OUTPATIENT
Start: 2022-08-22 | End: 2022-08-22

## 2022-08-22 RX ADMIN — IBUPROFEN 600 MG: 600 TABLET, FILM COATED ORAL at 19:25

## 2022-08-22 RX ADMIN — ACETAMINOPHEN 1000 MG: 500 TABLET, FILM COATED ORAL at 19:25

## 2022-08-22 ASSESSMENT — PAIN SCALES - GENERAL: PAINLEVEL_OUTOF10: 10

## 2022-08-22 NOTE — ED PROVIDER NOTES
[facility] Provider Note                   PCP:                Mark Lockhart MD               Age: 27 y.o. Sex: female       ICD-10-CM    1. Chest pain, unspecified type  R07.9           DISPOSITION Decision To Discharge 08/22/2022 08:40:36 PM        MDM  Number of Diagnoses or Management Options  Chest pain, unspecified type  Diagnosis management comments: Patient presents from work with chest pain that has been ongoing since 4 AM.  The pain is in the upper chest, and her story is less likely for typical cardiac ischemia. She has a strong family history and other risk factors though, and it was in my differential.  I also considered cardiac dysrhythmia, pneumothorax, pneumonia, pleural effusion. PE would be less likely as the patient has no associated shortness of breath, lower extremity swelling, family history. I was not concerned about aortic dissection as the patient had no complaints of ripping or tearing pain. She did have some back pain, but that was chronic in nature and not associated with the pain in her chest.  I also felt like it was less likely cardiac in nature because the pain was reproducible to palpation at the high upper chest, lower throat. Patient was given Tylenol and ibuprofen. A total of 2 troponins were obtained and both were negative. Patient's initial EKG showed T wave inversions with no old EKG to compare. A second EKG was done 2 hours later and was unchanged. I add on a lipase in the case of pancreatitis, and that was normal.  Patient's chest x-ray was read per radiology is negative. There is no need for admission on today's ED visit. Patient's heart score is less than 4. However, she would benefit from a primary care follow-up and the potential of an outpatient stress test.  Patient knows to come back to the ER if she develops worsening pain, shortness of breath, or if she is any other concerns.          Orders Placed This Encounter Procedures    XR CHEST (2 VW)    CBC    Comprehensive Metabolic Panel    Troponin    Lipase    HCG, Quantitative, Pregnancy    Cardiac Monitor    Pulse Oximetry    EKG 12 Lead    EKG 12 Lead    Saline lock IV        Nas Murdock is a 27 y.o. female who presents to the Emergency Department with chief complaint of    Chief Complaint   Patient presents with    Chest Pain      Pt here with chest pain since around 4a. She feels a sharp, stabbing pain in her upper chest. It's been constant since this morning. She has back pain in both the upper and lower back that is chronic in nature. Mild SOB today, but has been chronic. Chronic nausea, but no vomiting. No radiation of the stabbing chest pain. No meds taken PTA. Takes medication for acid reflux. Nml urinary and bowel habits. Review of Systems   All other systems reviewed and are negative.     Past Medical History:   Diagnosis Date    Abnormal Pap smear of cervix 12/12/2018    HPV +    Anemia     Arrhythmia Birth    Born with a heart murmur    Arthritis     Since I was a young child, my legs have always hurt    Chronic kidney disease     Overactive bladder    Chronic pain     Diabetes ([facility] [address] 06/2014    I was diagnosed with being a borderline diabetic    GERD (gastroesophageal reflux disease) 2015    I have severe acid reflux    Heart murmur     Irritable bowel syndrome with constipation     Overactive bladder     Prediabetes         Past Surgical History:   Procedure Laterality Date    COLONOSCOPY  04/2017    hemorrhoids    UPPER GASTROINTESTINAL ENDOSCOPY  04/2017    WISDOM TOOTH EXTRACTION          Family History   Problem Relation Age of Onset    Gall Bladder Disease Maternal Aunt         Stage 4 at time of death 10/2016    Diabetes Maternal Aunt     Diabetes Maternal Aunt     Alcohol Abuse Father     Diabetes Paternal Aunt     Diabetes Paternal Grandmother     Hypertension Brother     Ovarian Cancer Maternal Grandmother     Cancer Maternal Grandmother         Cervical cancer    Heart Disease Maternal Grandmother          of heart attack May 30, 1998. Her father side suffers from CHF; all of her siblings have all  of heart attacks due to complications of CHF    Alcohol Abuse Maternal Grandfather     Alcohol Abuse Paternal Grandfather     Diabetes Paternal Aunt     Hypertension Mother     Asthma Mother     Colon Cancer Mother 58    Cancer Maternal Aunt         Gallbladder cancer    Cancer Mother         Colon. She had early stage but they did a colon resectional surgery in 2016    Osteoarthritis Mother         Social History     Socioeconomic History    Marital status: Single   Tobacco Use    Smoking status: Never    Smokeless tobacco: Never   Substance and Sexual Activity    Alcohol use: Never    Drug use: Never         Patient has no known allergies. Previous Medications    AMLODIPINE (NORVASC) 2.5 MG TABLET    Take 1 tablet by mouth in the morning. TAKE ONE TABLET BY MOUTH ONE TIME DAILY. CALCIUM CARBONATE 1500 (600 CA) MG TABS TABLET    Take 600 mg by mouth 2 times daily    CHOLECALCIFEROL 50 MCG (2000 UT) TABS    Take by mouth    MEDROXYPROGESTERONE (DEPO-PROVERA) 150 MG/ML INJECTION    Inject 150 mg into the muscle    METFORMIN (GLUCOPHAGE) 500 MG TABLET    Take 500 mg by mouth 2 times daily (with meals)    OMEPRAZOLE (PRILOSEC) 20 MG DELAYED RELEASE CAPSULE    TAKE ONE CAPSULE BY MOUTH TWICE A DAY    TOLTERODINE (DETROL LA) 4 MG EXTENDED RELEASE CAPSULE    TAKE ONE CAPSULE BY MOUTH ONE TIME DAILY    TOLTERODINE (DETROL LA) 4 MG EXTENDED RELEASE CAPSULE    Take 1 capsule daily        Vitals signs and nursing note reviewed.    Patient Vitals for the past 4 hrs:   BP SpO2   22 -- 100 %   22 193 (!) 152/103 (!) 83 %   22 1916 125/84 100 %   22 1901 135/80 99 %   22 1846 (!) 165/101 (!) 89 %   22 1831 -- 100 %   22 1830 (!) 143/97 100 %          Physical Exam  Vitals and nursing note reviewed. Constitutional:       General: She is not in acute distress. Appearance: Normal appearance. She is not toxic-appearing. HENT:      Head: Normocephalic and atraumatic. Mouth/Throat:      Mouth: Mucous membranes are moist.   Eyes:      Extraocular Movements: Extraocular movements intact. Conjunctiva/sclera: Conjunctivae normal.      Pupils: Pupils are equal, round, and reactive to light. Cardiovascular:      Rate and Rhythm: Normal rate and regular rhythm. Pulmonary:      Effort: Pulmonary effort is normal.      Breath sounds: Normal breath sounds. Abdominal:      General: Bowel sounds are normal. There is no distension. Palpations: Abdomen is soft. Tenderness: There is no abdominal tenderness. There is no guarding. Musculoskeletal:         General: No deformity. Normal range of motion. Cervical back: Normal range of motion and neck supple. Comments: Reproducible tenderness to the upper chest wall to palpation; tenderness to palpation along the lower lumbar spine    Skin:     General: Skin is warm and dry. Capillary Refill: Capillary refill takes less than 2 seconds. Coloration: Skin is not jaundiced. Neurological:      General: No focal deficit present. Mental Status: She is alert and oriented to person, place, and time. Psychiatric:         Mood and Affect: Mood normal.         Behavior: Behavior normal.         Thought Content:  Thought content normal.        Procedures  EKG at 1630: NSR, 81, no STEMI, T wave inversions inferior and lateral leads (no old to compare)    Second EKG obtained at 1933: Normal sinus rhythm, 60, normal axis, no ST elevation, T wave inversions once again noted; unchanged from previous    Labs Reviewed   CBC - Abnormal; Notable for the following components:       Result Value    MPV 9.1 (*)     All other components within normal limits   COMPREHENSIVE METABOLIC PANEL - Abnormal; Notable for the following components:    Chloride 109 (*)     Anion Gap 3 (*)     Alk Phosphatase 42 (*)     Globulin 3.6 (*)     Albumin/Globulin Ratio 1.1 (*)     All other components within normal limits   TROPONIN   TROPONIN   LIPASE   HCG, QUANTITATIVE, PREGNANCY        XR CHEST (2 VW)   Final Result   The lungs are clear. The heart size is normal in size. No   pneumothorax. No pleural effusions. Voice dictation software was used during the making of this note. This software is not perfect and grammatical and other typographical errors may be present. This note has not been completely proofread for errors.      Ann Marie Sarmiento MD  08/22/22 204

## 2022-08-22 NOTE — Clinical Note
Manju Peña was seen and treated in our emergency department on 8/22/2022. She may return to work on 08/24/2022. If you have any questions or concerns, please don't hesitate to call.       Yahir aPrra MD

## 2022-08-23 LAB
EKG ATRIAL RATE: 81 BPM
EKG DIAGNOSIS: NORMAL
EKG P AXIS: 47 DEGREES
EKG P-R INTERVAL: 148 MS
EKG Q-T INTERVAL: 364 MS
EKG QRS DURATION: 76 MS
EKG QTC CALCULATION (BAZETT): 422 MS
EKG R AXIS: 58 DEGREES
EKG T AXIS: -23 DEGREES
EKG VENTRICULAR RATE: 81 BPM

## 2022-08-23 NOTE — ED NOTES
I have reviewed discharge instructions with the patient. The patient verbalized understanding. Patient left ED via Discharge Method: ambulatory to Home with self. Opportunity for questions and clarification provided. Patient given 0 scripts. To continue your aftercare when you leave the hospital, you may receive an automated call from our care team to check in on how you are doing. This is a free service and part of our promise to provide the best care and service to meet your aftercare needs.  If you have questions, or wish to unsubscribe from this service please call [phone]. Thank you for Choosing our [facility] Emergency Department.         Greg William RN  08/22/22 2100

## 2022-08-23 NOTE — DISCHARGE INSTRUCTIONS
Am happy to report that your work-up for heart attack today is normal.  Specifically, we did 2 screening tests called troponin. These evaluate you for heart attack. They were both normal.  Your EKG, chest x-ray, and the rest of your labs were also normal.  I do not know exactly what is causing your chest pain, but I do not think it is anything life-threatening. I recommend taking 500 mg of Tylenol every 4-6 hours or 600 mg of ibuprofen every 6-8 hours as needed for pain control. Because you have a strong family history of heart disease, you should still talk to your primary care doctor about getting an outpatient stress test for your heart. Please call and make an appointment. If you have worsening chest pain, shortness of breath, or any other concerns, please come back to the emergency room for repeat evaluation.

## 2022-08-25 NOTE — PROGRESS NOTES
Patient presents today for a routine gynecological examination with no complaints. Doing well on depo provera and wishes to continue. Due for her next injection now, having next inj . OB History          0    Para        Term   0       0    AB   0    Living   0         SAB        IAB        Ectopic        Molar        Multiple        Live Births                      GYN History     Pap: 19 Neg/HPV Neg      No LMP recorded. Patient has had an injection. trace positive postcoital bleeding    Past Medical History:  Past Medical History:   Diagnosis Date    Abnormal Pap smear of cervix 2018    HPV +    Anemia     Arrhythmia Birth    Born with a heart murmur    Arthritis     Since I was a young child, my legs have always hurt    Chronic kidney disease     Overactive bladder    Chronic pain     Diabetes ([facility] [address] 2014    I was diagnosed with being a borderline diabetic    GERD (gastroesophageal reflux disease)     I have severe acid reflux    Heart murmur     Irritable bowel syndrome with constipation     Overactive bladder     Prediabetes        Past Surgical History:  Past Surgical History:   Procedure Laterality Date    COLONOSCOPY  2017    hemorrhoids    UPPER GASTROINTESTINAL ENDOSCOPY  2017    WISDOM TOOTH EXTRACTION         Allergies:   No Known Allergies    Medication History:  Current Outpatient Medications   Medication Sig Dispense Refill    Prenatal MV-Min-Fe Fum-FA-DHA (PRENATAL 1 PO) Take by mouth      amLODIPine (NORVASC) 2.5 MG tablet Take 1 tablet by mouth in the morning. TAKE ONE TABLET BY MOUTH ONE TIME DAILY.  30 tablet 2    tolterodine (DETROL LA) 4 MG extended release capsule TAKE ONE CAPSULE BY MOUTH ONE TIME DAILY 90 capsule 0    omeprazole (PRILOSEC) 20 MG delayed release capsule TAKE ONE CAPSULE BY MOUTH TWICE A  capsule 0    calcium carbonate 1500 (600 Ca) MG TABS tablet Take 600 mg by mouth 2 times daily      Cholecalciferol 50 MCG ( UT) TABS Take by mouth      medroxyPROGESTERone (DEPO-PROVERA) 150 MG/ML injection Inject 150 mg into the muscle      metFORMIN (GLUCOPHAGE) 500 MG tablet Take 500 mg by mouth 2 times daily (with meals)       No current facility-administered medications for this visit. Social History:  Social History     Socioeconomic History    Marital status: Single     Spouse name: Not on file    Number of children: Not on file    Years of education: Not on file    Highest education level: Not on file   Occupational History    Not on file   Tobacco Use    Smoking status: Never    Smokeless tobacco: Never   Substance and Sexual Activity    Alcohol use: Never    Drug use: Never    Sexual activity: Yes     Partners: Male     Birth control/protection: Injection   Other Topics Concern    Not on file   Social History Narrative    Not on file     Social Determinants of Health     Financial Resource Strain: Not on file   Food Insecurity: Not on file   Transportation Needs: Not on file   Physical Activity: Not on file   Stress: Not on file   Social Connections: Not on file   Intimate Partner Violence: Not on file   Housing Stability: Not on file       Family History:  Family History   Problem Relation Age of Onset    Gall Bladder Disease Maternal Aunt         Stage 4 at time of death 10/2016    Diabetes Maternal Aunt     Diabetes Maternal Aunt     Alcohol Abuse Father     Diabetes Paternal Aunt     Diabetes Paternal Grandmother     Hypertension Brother     Ovarian Cancer Maternal Grandmother     Cancer Maternal Grandmother         Cervical cancer    Heart Disease Maternal Grandmother          of heart attack May 30, 1998.  Her father side suffers from CHF; all of her siblings have all  of heart attacks due to complications of CHF    Alcohol Abuse Maternal Grandfather     Alcohol Abuse Paternal Grandfather     Diabetes Paternal Aunt     Hypertension Mother     Asthma Mother     Colon Cancer Mother 58    Cancer Maternal Aunt Gallbladder cancer    Cancer Mother         Colon. She had early stage but they did a colon resectional surgery in October 2016    Osteoarthritis Mother        Review of Systems - General ROS: negative except for that discussed in HPI      ROS:  Feeling well. No dyspnea or chest pain on exertion. No abdominal pain, change in bowel habits, black or bloody stools. No urinary tract symptoms. No neurological complaints. Objective:   /84   Ht 5' 5.5\" (1.664 m)   Wt 221 lb 3.2 oz (100.3 kg)   BMI 36.25 kg/m²   The patient appears well, alert, oriented x 3, in no distress. ENT normal.  Neck supple. No adenopathy or thyromegaly. Lungs:  clear, good air entry, no wheezes, rhonchi or rales. Heart:  S1 and S2 normal, no murmurs, regular rate and rhythm. Abdomen:  soft without tenderness, guarding, mass or organomegaly. Extremities show no edema, normal peripheral pulses. Neurological is normal, no focal findings. BREAST EXAM: breasts appear normal, no suspicious masses, no skin or nipple changes or axillary nodes, risk and benefit of breast self-exam was discussed    PELVIC EXAM: VULVA: normal appearing vulva with no masses, tenderness or lesions, VAGINA: normal appearing vagina with normal color and discharge, no lesions, CERVIX: normal appearing cervix without discharge or lesions, UTERUS: uterus is normal size, shape, consistency and nontender, ADNEXA: normal adnexa in size, nontender and no masses    Assessment/Plan:     1. Well woman exam    - AMB POC URINALYSIS DIP STICK AUTO W/O MICRO  - PAP IG, Aptima HPV and rfx 16/18,45 ([id number]); Future  - PAP IG, Aptima HPV and rfx 16/18,45 ([id number])    2. Screening for HPV (human papillomavirus)    - PAP IG, Aptima HPV and rfx 16/18,45 ([id number]); Future  - PAP IG, Aptima HPV and rfx 16/18,45 ([id number])    3.  Screening for genitourinary condition    - AMB POC URINALYSIS DIP STICK AUTO W/O MICRO   Rtc for depo as scheduled  pap smear  return annually or prn    Supervising physician is Dr. Brian Dietrich.

## 2022-08-26 ENCOUNTER — OFFICE VISIT (OUTPATIENT)
Dept: OBGYN CLINIC | Age: 32
End: 2022-08-26
Payer: COMMERCIAL

## 2022-08-26 ENCOUNTER — TELEMEDICINE (OUTPATIENT)
Dept: FAMILY MEDICINE CLINIC | Facility: CLINIC | Age: 32
End: 2022-08-26
Payer: COMMERCIAL

## 2022-08-26 VITALS
WEIGHT: 221.2 LBS | HEIGHT: 66 IN | SYSTOLIC BLOOD PRESSURE: 138 MMHG | BODY MASS INDEX: 35.55 KG/M2 | DIASTOLIC BLOOD PRESSURE: 84 MMHG

## 2022-08-26 DIAGNOSIS — Z11.51 SCREENING FOR HPV (HUMAN PAPILLOMAVIRUS): ICD-10-CM

## 2022-08-26 DIAGNOSIS — R73.03 PREDIABETES: Primary | ICD-10-CM

## 2022-08-26 DIAGNOSIS — Z01.419 WELL WOMAN EXAM: Primary | ICD-10-CM

## 2022-08-26 DIAGNOSIS — R07.9 CHEST PAIN, UNSPECIFIED TYPE: ICD-10-CM

## 2022-08-26 DIAGNOSIS — Z13.89 SCREENING FOR GENITOURINARY CONDITION: ICD-10-CM

## 2022-08-26 DIAGNOSIS — K21.9 GASTROESOPHAGEAL REFLUX DISEASE WITHOUT ESOPHAGITIS: ICD-10-CM

## 2022-08-26 LAB
BILIRUBIN, URINE, POC: NEGATIVE
BLOOD URINE, POC: NORMAL
GLUCOSE URINE, POC: NEGATIVE
KETONES, URINE, POC: NEGATIVE
LEUKOCYTE ESTERASE, URINE, POC: NORMAL
NITRITE, URINE, POC: NEGATIVE
PH, URINE, POC: 7 (ref 4.6–8)
PROTEIN,URINE, POC: NEGATIVE
SPECIFIC GRAVITY, URINE, POC: 1.02 (ref 1–1.03)
URINALYSIS CLARITY, POC: CLEAR
URINALYSIS COLOR, POC: YELLOW
UROBILINOGEN, POC: NORMAL

## 2022-08-26 PROCEDURE — 99214 OFFICE O/P EST MOD 30 MIN: CPT | Performed by: PHYSICIAN ASSISTANT

## 2022-08-26 PROCEDURE — 99395 PREV VISIT EST AGE 18-39: CPT | Performed by: NURSE PRACTITIONER

## 2022-08-26 PROCEDURE — 81003 URINALYSIS AUTO W/O SCOPE: CPT | Performed by: NURSE PRACTITIONER

## 2022-08-26 RX ORDER — OMEPRAZOLE 40 MG/1
40 CAPSULE, DELAYED RELEASE ORAL DAILY
Qty: 90 CAPSULE | Refills: 1 | Status: SHIPPED | OUTPATIENT
Start: 2022-08-26

## 2022-08-26 RX ORDER — FAMOTIDINE 40 MG/1
40 TABLET, FILM COATED ORAL EVERY EVENING
Qty: 90 TABLET | Refills: 3 | Status: SHIPPED | OUTPATIENT
Start: 2022-08-26

## 2022-08-26 NOTE — PROGRESS NOTES
Patient: Gricelda Love  YOB: 1990  Patient Age 27 y.o. Patient sex: female  Medical Record:  [id number]  Visit Date: 2022  Author:  Abril Rodrigues. [facility] Virtual  Visit Note Video Conference Note  Location: To Patients electronic /phone device in their home  From Medical provider     Gricelda Love is a 27 y.o. y.o. female  being evaluated by a Virtual Visit (video visit) encounter to address concerns. A caregiver was present when appropriate. Due to this being a TeleHealth encounter (During [facility] public health emergency), evaluation of the following organ systems was limited: Vitals/Constitutional/EENT/Resp/CV/GI//MS/Neuro/Skin/Heme-Lymph-Imm. Pursuant to the emergency declaration under the [address] [place] [address], [address] [place] [address] authority and the [employer] and [employer] Act, this Virtual Visit was conducted with patient's (and/or legal guardian's) consent, to reduce the risk of exposure to COVID-19 and provide necessary medical care. Consent:  The patient and/or health care decision maker is aware that that they may receive a bill for this audio-video service, depending on his insurance coverage, and has provided verbal consent to proceed: Yes    Chief Complaint  Gricelda Love  is a 27 y.o. female who was seen by synchronous (real-time) audio-video technology on 22  3:42 PM  for the following reasons:    Chief Complaint   Patient presents with    Follow-up     Seen at ER on 22 for chest pain, needs a stress test done        Current medical issues addressed today:  She was seen at er  22 with chest pain. Troponins were normal neg. She has continued to have some pains. If they happen now its for a few min. The day of the ER it lasted for 10 hours.   She does have famiy hx of dm - maternal grandmother and great aunts and great uncles all had heart disease, paternal gf -  of stroke in his 76s. Associated with nausea fatigue and fear. She works a strenuous job and worried about it. She has worked for weight loss and manages dm with metformin but hasn't had a1c in a year. She notes she is having reflux and some discomfort with it. ASSESSMENT & PLAN    ICD-10-CM    1. Prediabetes  R73.03 Urinalysis     Hemoglobin A1C     Comprehensive Metabolic Panel     Lipid Panel     Microalbumin / Creatinine Urine Ratio      2. Gastroesophageal reflux disease without esophagitis  K21.9 [facility]      3. Chest pain, unspecified type  R07.9 [address]           Diagnoses and all orders for this visit:  Prediabetes  -     Urinalysis; Future  -     Hemoglobin A1C; Future  -     Comprehensive Metabolic Panel; Future  -     Lipid Panel; Future  -     Microalbumin / Creatinine Urine Ratio; Future  Gastroesophageal reflux disease without esophagitis  -     [facility]  Chest pain, unspecified type  -     [address]  Other orders  -     metFORMIN (GLUCOPHAGE) 500 MG tablet; Take 1 tablet by mouth 2 times daily (with meals)  -     omeprazole (PRILOSEC) 40 MG delayed release capsule; Take 1 capsule by mouth Daily 30 min before breakfast  -     famotidine (PEPCID) 40 MG tablet; Take 1 tablet by mouth every evening Before supper   Shailesh Murillo was seen today for follow-up. Diagnoses and all orders for this visit:    Prediabetes  -     Urinalysis; Future  -     Hemoglobin A1C; Future  -     Comprehensive Metabolic Panel; Future  -     Lipid Panel; Future  -     Microalbumin / Creatinine Urine Ratio; Future    Gastroesophageal reflux disease without esophagitis  -     [facility]    Chest pain, unspecified type  -     [address]    Other orders  -     metFORMIN (GLUCOPHAGE) 500 MG tablet;  Take 1 tablet by mouth 2 times daily (with meals)  -     omeprazole (PRILOSEC) 40 MG delayed release capsule; Take 1 capsule by mouth Daily 30 min before breakfast  -     famotidine (PEPCID) 40 MG tablet; Take 1 tablet by mouth every evening Before supper    No follow-up provider specified.   Orders Placed This Encounter   Procedures    Urinalysis     Standing Status:   Future     Standing Expiration Date:   8/26/2023    Hemoglobin A1C     Standing Status:   Future     Standing Expiration Date:   8/26/2023    Comprehensive Metabolic Panel     Standing Status:   Future     Standing Expiration Date:   8/26/2023    Lipid Panel     Standing Status:   Future     Standing Expiration Date:   8/26/2023    Microalbumin / Creatinine Urine Ratio     Standing Status:   Future     Standing Expiration Date:   8/26/2023    [facility]     Referral Priority:   Routine     Referral Type:   Eval and Treat     Referral Reason:   Specialty Services Required     Referral Location:   [facility]     Requested Specialty:   Gastroenterology     Number of Visits Requested:   1    [address]     Referral Priority:   Routine     Referral Type:   Eval and Treat     Referral Reason:   Specialty Services Required     Requested Specialty:   Cardiology     Number of Visits Requested:   1     Plan  Discussed changing reflux meds and making apt with GI and consider EGD with current symptoms and follow up prn   Orders Placed This Encounter   Procedures    Urinalysis     Standing Status:   Future     Standing Expiration Date:   8/26/2023    Hemoglobin A1C     Standing Status:   Future     Standing Expiration Date:   8/26/2023    Comprehensive Metabolic Panel     Standing Status:   Future     Standing Expiration Date:   8/26/2023    Lipid Panel     Standing Status:   Future     Standing Expiration Date:   8/26/2023    Microalbumin / Creatinine Urine Ratio     Standing Status:   Future     Standing Expiration Date:   8/26/2023    [facility]     Referral Priority:   Routine     Referral Type:   Eval and Treat     Referral Reason:   Specialty Services Required     Referral Location:   [facility]     Requested Specialty:   Gastroenterology     Number of Visits Requested:   1    [address]     Referral Priority:   Routine     Referral Type:   Eval and Treat     Referral Reason:   Specialty Services Required     Requested Specialty:   Cardiology     Number of Visits Requested:   1     I have reviewed the patient's past medical history, social history and family history and vitals. We have discussed treatment plan and follow up and given patient instructions. Patient's questions are answered and we will follow up as indicated. Follow-up and Dispositions    Return in about 6 months (around 2/26/2023). Past Medical history  Allergies:No Known Allergies    Current Medications:   Current Outpatient Medications   Medication Sig Dispense Refill    Prenatal MV-Min-Fe Fum-FA-DHA (PRENATAL 1 PO) Take by mouth      metFORMIN (GLUCOPHAGE) 500 MG tablet Take 1 tablet by mouth 2 times daily (with meals) 180 tablet 1    omeprazole (PRILOSEC) 40 MG delayed release capsule Take 1 capsule by mouth Daily 30 min before breakfast 90 capsule 1    famotidine (PEPCID) 40 MG tablet Take 1 tablet by mouth every evening Before supper 90 tablet 3    amLODIPine (NORVASC) 2.5 MG tablet Take 1 tablet by mouth in the morning. TAKE ONE TABLET BY MOUTH ONE TIME DAILY. 30 tablet 2    tolterodine (DETROL LA) 4 MG extended release capsule TAKE ONE CAPSULE BY MOUTH ONE TIME DAILY 90 capsule 0    calcium carbonate 1500 (600 Ca) MG TABS tablet Take 600 mg by mouth 2 times daily      Cholecalciferol 50 MCG (2000 UT) TABS Take by mouth      medroxyPROGESTERone (DEPO-PROVERA) 150 MG/ML injection Inject 150 mg into the muscle       No current facility-administered medications for this visit.          Current Problem List:   Patient Active Problem List   Diagnosis    Numbness and tingling of both feet    Joint pain in fingers of both hands    Obesity, morbid (HCC)    GERD (gastroesophageal reflux disease)    Joint pain, knee    Paresthesia of both hands    Lower back pain    Weakness of both legs    Encounter for medication management    Type 2 diabetes mellitus ([facility] [address]       Social History:   Social History     Tobacco Use    Smoking status: Never    Smokeless tobacco: Never   Substance Use Topics    Alcohol use: Never       Family History:   Family History   Problem Relation Age of Onset    Gall Bladder Disease Maternal Aunt         Stage 4 at time of death 10/2016    Diabetes Maternal Aunt     Diabetes Maternal Aunt     Alcohol Abuse Father     Diabetes Paternal Aunt     Diabetes Paternal Grandmother     Hypertension Brother     Ovarian Cancer Maternal Grandmother     Cancer Maternal Grandmother         Cervical cancer    Heart Disease Maternal Grandmother          of heart attack May 30, 1998. Her father side suffers from CHF; all of her siblings have all  of heart attacks due to complications of CHF    Alcohol Abuse Maternal Grandfather     Alcohol Abuse Paternal Grandfather     Diabetes Paternal Aunt     Hypertension Mother     Asthma Mother     Colon Cancer Mother 58    Cancer Maternal Aunt         Gallbladder cancer    Cancer Mother         Colon. She had early stage but they did a colon resectional surgery in 2016    Osteoarthritis Mother        Surgical History:  Past Surgical History:   Procedure Laterality Date    COLONOSCOPY  2017    hemorrhoids    UPPER GASTROINTESTINAL ENDOSCOPY  2017    WISDOM TOOTH EXTRACTION         ROS  Review of Systems   Constitutional: Negative for fever. Respiratory: Negative for shortness of breath. Cardiovascular: Negative for chest pain. Neurological: Negative for syncope.        Vitals:  those vailabe due to teleconference  are noted below provided by the patient's device in their home  Patient-Reported Systolic (Top): 824 mmHg  Patient-Reported Diastolic (Bottom): 84 mmHg  BP Observations: Yes, BP was taken on electronic monitoring device with digital readout  Patient-Reported Weight: 221 lbs         There is no height or weight on file to calculate BMI. Physical Exam    Vitals reviewed. Constitutional:       Appearance: Normal appearance. HENT:      Head: Atraumatic. Eyes:      Conjunctiva/sclera: Conjunctivae normal.   Pulmonary:      Effort: Pulmonary effort is normal.   Musculoskeletal:      Cervical back: Neck supple. Skin:     Coloration: Skin is not jaundiced or pale. Neurological:      General: No focal deficit present. Mental Status: He is alert. Psychiatric:         Mood and Affect: Mood normal.         We discussed the expected course, resolution and complications of the diagnosis(es) in detail. Medication risks, benefits, costs, interactions, and alternatives were discussed as indicated. I advised him to contact the office if his condition worsens, changes or fails to improve as anticipated. He expressed understanding with the diagnosis(es) and plan. Pursuant to the emergency declaration under the [address] [place] [address], [facility] waiver authority and the [employer] and [employer] Act, this Virtual  Visit was conducted, with patient's consent, to reduce the patient's risk of exposure to COVID-19 and provide continuity of care for an established patient. Services were provided through a video synchronous discussion -- on [employer] ME virtually to substitute for in-person clinic visit  Return in about 6 months (around 2/26/2023). Patient advised to call the office if symptoms worsen. Paola Tillman PA-C  3:42 PM  8/26/2022

## 2022-09-01 LAB
CYTOLOGIST CVX/VAG CYTO: ABNORMAL
CYTOLOGY CVX/VAG DOC THIN PREP: ABNORMAL
HPV APTIMA: POSITIVE
HPV GENOTYPE 18,45: NEGATIVE
HPV, GENOTYPE 16: NEGATIVE
Lab: ABNORMAL
Lab: ABNORMAL
PATH REPORT.FINAL DX SPEC: ABNORMAL
STAT OF ADQ CVX/VAG CYTO-IMP: ABNORMAL

## 2022-09-06 RX ORDER — FLUCONAZOLE 150 MG/1
150 TABLET ORAL ONCE
Qty: 1 TABLET | Refills: 0 | Status: SHIPPED | OUTPATIENT
Start: 2022-09-06 | End: 2022-09-06

## 2022-09-08 ENCOUNTER — OFFICE VISIT (OUTPATIENT)
Dept: OBGYN CLINIC | Age: 32
End: 2022-09-08

## 2022-09-08 DIAGNOSIS — Z30.42 ENCOUNTER FOR SURVEILLANCE OF INJECTABLE CONTRACEPTIVE: Primary | ICD-10-CM

## 2022-09-08 NOTE — PROGRESS NOTES
Note   Depo-Provera 150mg/mL      Lot Number: [id number]  Expiration: 08/2024  Site given: Left gluteus alan IM  WCE:[id number]  Return Date: 11/24 - 12/08  Patient tolerated procedure well

## 2022-09-08 NOTE — PROGRESS NOTES
[facility] CARDIOLOGY History & Physical                 Reason for Visit: Chest pain    Subjective:     Patient is a 27 y.o. female with a PMH of hypertension and diabetes who presents as a referral for chest pain. The patient visited the ED on  for chest pain. Troponin was negative x2. The patient reports that she presented to the ED with a \"sharp\" pain on the left side of the chest.  She says the pain was on the surface of the chest.  She denies hemoptysis. She said it was constant for at least a day. She does report DYER. Past Medical History:   Diagnosis Date    Abnormal Pap smear of cervix 2018    HPV +    Anemia     Arrhythmia Birth    Born with a heart murmur    Arthritis     Since I was a young child, my legs have always hurt    Chronic kidney disease     Overactive bladder    Chronic pain     Diabetes ([facility] [address] 2014    I was diagnosed with being a borderline diabetic    GERD (gastroesophageal reflux disease)     I have severe acid reflux    Heart murmur     Irritable bowel syndrome with constipation     Overactive bladder     Prediabetes       Past Surgical History:   Procedure Laterality Date    COLONOSCOPY  2017    hemorrhoids    UPPER GASTROINTESTINAL ENDOSCOPY  2017    WISDOM TOOTH EXTRACTION        Family History   Problem Relation Age of Onset    Gall Bladder Disease Maternal Aunt         Stage 4 at time of death 10/2016    Diabetes Maternal Aunt     Diabetes Maternal Aunt     Alcohol Abuse Father     Diabetes Paternal Aunt     Diabetes Paternal Grandmother     Hypertension Brother     Ovarian Cancer Maternal Grandmother     Cancer Maternal Grandmother         Cervical cancer    Heart Disease Maternal Grandmother          of heart attack May 30, 1998.  Her father side suffers from CHF; all of her siblings have all  of heart attacks due to complications of CHF    Alcohol Abuse Maternal Grandfather     Alcohol Abuse Paternal Grandfather     Diabetes Paternal Aunt Hypertension Mother     Asthma Mother     Colon Cancer Mother 58    Cancer Maternal Aunt         Gallbladder cancer    Cancer Mother         Colon. She had early stage but they did a colon resectional surgery in October 2016    Osteoarthritis Mother       Social History     Tobacco Use    Smoking status: Never    Smokeless tobacco: Never   Substance Use Topics    Alcohol use: Never      No Known Allergies      ROS:  No obvious pertinent positives on review of systems except for what was outlined above.        Objective:       BP (!) 142/98   Pulse 58   Ht 5' 5.5\" (1.664 m)   Wt 219 lb 6.4 oz (99.5 kg)   BMI 35.95 kg/m²     BP Readings from Last 3 Encounters:   09/09/22 (!) 142/98   08/26/22 138/84   08/22/22 (!) 152/103       Wt Readings from Last 3 Encounters:   09/09/22 219 lb 6.4 oz (99.5 kg)   08/26/22 221 lb 3.2 oz (100.3 kg)   08/22/22 225 lb (102.1 kg)       General/Constitutional:   Alert and oriented x 3, no acute distress  HEENT:   normocephalic, atraumatic, moist mucous membranes  Neck:   No JVD or carotid bruits bilaterally  Cardiovascular:   regular rate and rhythm, no rub/gallop appreciated  Pulmonary:   clear to auscultation bilaterally, no respiratory distress  Abdomen:   soft, non-tender, non-distended  Ext:   No sig LE edema bilaterally  Skin:  warm and dry, no obvious rashes seen  Neuro:   no obvious sensory or motor deficits  Psychiatric:   normal mood and affect      ECG:   Sinus bradycardia  Nonspecific ST and/or T wave abnormalities  Heart rate 58 bpm    Data Review:   Lab Results   Component Value Date    CHOL 188 07/23/2021     Lab Results   Component Value Date    TRIG 169 (H) 07/23/2021     Lab Results   Component Value Date    HDL 34 (L) 07/23/2021     Lab Results   Component Value Date    LDLCALC 124 (H) 07/23/2021     Lab Results   Component Value Date    VLDL 30 07/23/2021     No results found for: [facility]     Lab Results   Component Value Date/Time     08/22/2022 04:39 PM     07/23/2021 10:40 AM     11/11/2020 09:20 AM    K 3.8 08/22/2022 04:39 PM    K 5.0 07/23/2021 10:40 AM    K 4.9 11/11/2020 09:20 AM     08/22/2022 04:39 PM     07/23/2021 10:40 AM    CL 99 11/11/2020 09:20 AM    CO2 29 08/22/2022 04:39 PM    CO2 26 07/23/2021 10:40 AM    CO2 25 11/11/2020 09:20 AM    BUN 11 08/22/2022 04:39 PM    BUN 12 07/23/2021 10:40 AM    BUN 9 11/11/2020 09:20 AM    CREATININE 0.80 08/22/2022 04:39 PM    CREATININE 0.79 07/23/2021 10:40 AM    CREATININE 0.89 11/11/2020 09:20 AM    GLUCOSE 83 08/22/2022 04:39 PM    GLUCOSE 96 07/23/2021 10:40 AM    GLUCOSE 81 11/11/2020 09:20 AM    CALCIUM 9.4 08/22/2022 04:39 PM    CALCIUM 9.4 07/23/2021 10:40 AM    CALCIUM 9.6 11/11/2020 09:20 AM         Lab Results   Component Value Date    ALT 27 08/22/2022    ALT 18 07/23/2021    ALT 15 11/11/2020    AST 17 08/22/2022    AST 19 07/23/2021    AST 20 11/11/2020        Assessment/Plan:   1. Hypertension, unspecified type  - Continue with amlodipine  - Defer to PCP for management    2. Atypical chest pain  - Obtain an CARLTON   - PE unlikely per PE Wells score     3. Obesity (BMI 30-39.9)  - Educated on Mediterranean diet and exercise    4.  DYER (dyspnea on exertion)  - Obtain an CARLTON with a full echocardiogram    F/U: As needed    Jesse Schmid MD

## 2022-09-09 ENCOUNTER — INITIAL CONSULT (OUTPATIENT)
Dept: CARDIOLOGY CLINIC | Age: 32
End: 2022-09-09
Payer: COMMERCIAL

## 2022-09-09 VITALS
HEIGHT: 66 IN | WEIGHT: 219.4 LBS | DIASTOLIC BLOOD PRESSURE: 98 MMHG | BODY MASS INDEX: 35.26 KG/M2 | SYSTOLIC BLOOD PRESSURE: 142 MMHG | HEART RATE: 58 BPM

## 2022-09-09 DIAGNOSIS — I10 HYPERTENSION, UNSPECIFIED TYPE: Primary | ICD-10-CM

## 2022-09-09 DIAGNOSIS — E66.9 OBESITY (BMI 30-39.9): ICD-10-CM

## 2022-09-09 DIAGNOSIS — R06.09 DOE (DYSPNEA ON EXERTION): ICD-10-CM

## 2022-09-09 DIAGNOSIS — R07.89 ATYPICAL CHEST PAIN: ICD-10-CM

## 2022-09-09 PROCEDURE — 93000 ELECTROCARDIOGRAM COMPLETE: CPT | Performed by: INTERNAL MEDICINE

## 2022-09-09 PROCEDURE — 99204 OFFICE O/P NEW MOD 45 MIN: CPT | Performed by: INTERNAL MEDICINE

## 2022-09-26 RX ORDER — AMLODIPINE BESYLATE 2.5 MG/1
TABLET ORAL
Qty: 30 TABLET | Refills: 2 | OUTPATIENT
Start: 2022-09-26

## 2022-10-06 DIAGNOSIS — R73.03 PREDIABETES: Primary | ICD-10-CM

## 2022-10-07 DIAGNOSIS — R73.03 PREDIABETES: ICD-10-CM

## 2022-10-07 LAB
CHOLEST SERPL-MCNC: 175 MG/DL
CREAT UR-MCNC: 386 MG/DL
EST. AVERAGE GLUCOSE BLD GHB EST-MCNC: 114 MG/DL
HBA1C MFR BLD: 5.6 % (ref 4.8–5.6)
HDLC SERPL-MCNC: 37 MG/DL (ref 40–60)
HDLC SERPL: 4.7 {RATIO}
LDLC SERPL CALC-MCNC: 119.4 MG/DL
MICROALBUMIN UR-MCNC: 3.79 MG/DL
MICROALBUMIN/CREAT UR-RTO: 10 MG/G
TRIGL SERPL-MCNC: 93 MG/DL (ref 35–150)
VLDLC SERPL CALC-MCNC: 18.6 MG/DL (ref 6–23)

## 2022-10-10 ENCOUNTER — TELEPHONE (OUTPATIENT)
Dept: CARDIOLOGY CLINIC | Age: 32
End: 2022-10-10

## 2022-10-10 NOTE — TELEPHONE ENCOUNTER
----- Message from Adri Esparza MD sent at 10/9/2022  5:56 PM EDT -----  Please let the patient know the stress test was negative for myocardial ischemia.

## 2022-10-10 NOTE — TELEPHONE ENCOUNTER
Left message on voicemail with stress test results and Dr. Nasra Cannon response. In  message, advised patient to call with any questions or concerns.

## 2022-11-10 RX ORDER — OMEPRAZOLE 20 MG/1
CAPSULE, DELAYED RELEASE ORAL
Qty: 180 CAPSULE | Refills: 0 | OUTPATIENT
Start: 2022-11-10

## 2022-11-14 ENCOUNTER — PATIENT MESSAGE (OUTPATIENT)
Dept: FAMILY MEDICINE CLINIC | Facility: CLINIC | Age: 32
End: 2022-11-14

## 2022-11-14 DIAGNOSIS — R10.9 GASTRIC PAIN: Primary | ICD-10-CM

## 2022-11-16 ENCOUNTER — TELEPHONE (OUTPATIENT)
Dept: FAMILY MEDICINE CLINIC | Facility: CLINIC | Age: 32
End: 2022-11-16

## 2022-11-16 DIAGNOSIS — R10.9 GASTRIC PAIN: Primary | ICD-10-CM

## 2022-11-16 NOTE — TELEPHONE ENCOUNTER
Patient requests gastro referral to be sent to [facility] in [address] [address]

## 2022-12-01 NOTE — PROGRESS NOTES
The patient is a 27 y.o. No obstetric history on file. who is seen for to discuss birth control options. Pt states wanting to stop the Depo shot. Per pt she discussed at last visit issues related to her left inverted nipple. Pt would like to discuss further    Last depo was given 9/8/22  Due 11/24-12/8  Would like to discuss other forms of BC but may end up continuing depo as she has good cystic suppression of her ovaries. At her last [employer] pt had inverted L nipple. I had asked if her nipple had always been inverted in this fashion and pt had indicated that it had always been that way and was not a new finding. She now tells me she does not think her nipple has always been inverted and this may be a newer finding. She denies nipple discharge or breast mass. Pt is concerned about this today. HISTORY:    No obstetric history on file. No LMP recorded. Patient has had an injection. Sexual History:  single partner, contraception - Depo-Provera injections  Contraception:  Depo-Provera injections  Current Outpatient Medications on File Prior to Visit   Medication Sig Dispense Refill    Prenatal MV-Min-Fe Fum-FA-DHA (PRENATAL 1 PO) Take by mouth      metFORMIN (GLUCOPHAGE) 500 MG tablet Take 1 tablet by mouth 2 times daily (with meals) 180 tablet 1    famotidine (PEPCID) 40 MG tablet Take 1 tablet by mouth every evening Before supper 90 tablet 3    amLODIPine (NORVASC) 2.5 MG tablet Take 1 tablet by mouth in the morning. TAKE ONE TABLET BY MOUTH ONE TIME DAILY. 30 tablet 2    calcium carbonate 1500 (600 Ca) MG TABS tablet Take 600 mg by mouth 2 times daily      Cholecalciferol 50 MCG (2000 UT) TABS Take by mouth      medroxyPROGESTERone (DEPO-PROVERA) 150 MG/ML injection Inject 150 mg into the muscle      omeprazole (PRILOSEC) 40 MG delayed release capsule Take 1 capsule by mouth Daily 30 min before breakfast 90 capsule 1     No current facility-administered medications on file prior to visit.        ROS:  Feeling well. No dyspnea or chest pain on exertion. No abdominal pain, change in bowel habits, black or bloody stools. No urinary tract symptoms. GYN ROS: she complains of nipple inversion, depo provera use. PHYSICAL EXAM:  Blood pressure 122/78, height 5' 5.5\" (1.664 m), weight 215 lb 9.6 oz (97.8 kg). The patient appears well, alert, oriented x 3, in no distress. Lungs are clear. Heart RRR, no murmurs. Abdomen soft without tenderness, guarding, mass or organomegaly. Breasts: L breast with inverted nipple, no discharge masses or skin changes noted. R breast with no nipple changes masses or discharge. ASSESSMENT:  Encounter Diagnoses   Name Primary? Inverted nipple Yes    Encounter for counseling regarding contraception     Severe obesity (BMI 35.0-39. 9) with comorbidity ([address]        PLAN:  All questions answered  Diagnosis explained in detail, including differential  Lengthy disc with pt re: options for [facility]. Offered Ocp, pt states did not do well on ocp and does not wish to start. Disc IUD as an option, she states may want to get pregnant soon so would prefer different option. Pt would prefer to stay on depo provera inj at this time, she will rtc next week for this. Will check L breast US and bilateral mgm      Orders Placed This Encounter   Procedures    US BREAST COMPLETE LEFT     Standing Status:   Future     Standing Expiration Date:   12/2/2023     Order Specific Question:   Reason for exam:     Answer:   Left inverted nipple. DIMITRI CHELSEY DIGITAL DIAGNOSTIC BILATERAL     Standing Status:   Future     Standing Expiration Date:   2/2/2024         Supervising physician is Dr. Omaira Cruz.   30 min chart review, exam, counseling and documentation

## 2022-12-02 ENCOUNTER — OFFICE VISIT (OUTPATIENT)
Dept: OBGYN CLINIC | Age: 32
End: 2022-12-02
Payer: COMMERCIAL

## 2022-12-02 VITALS
WEIGHT: 215.6 LBS | HEIGHT: 66 IN | BODY MASS INDEX: 34.65 KG/M2 | SYSTOLIC BLOOD PRESSURE: 122 MMHG | DIASTOLIC BLOOD PRESSURE: 78 MMHG

## 2022-12-02 DIAGNOSIS — N64.59 INVERTED NIPPLE: Primary | ICD-10-CM

## 2022-12-02 DIAGNOSIS — E66.01 SEVERE OBESITY (BMI 35.0-39.9) WITH COMORBIDITY (HCC): ICD-10-CM

## 2022-12-02 DIAGNOSIS — Z30.09 ENCOUNTER FOR COUNSELING REGARDING CONTRACEPTION: ICD-10-CM

## 2022-12-02 PROCEDURE — 3078F DIAST BP <80 MM HG: CPT | Performed by: NURSE PRACTITIONER

## 2022-12-02 PROCEDURE — 99214 OFFICE O/P EST MOD 30 MIN: CPT | Performed by: NURSE PRACTITIONER

## 2022-12-02 PROCEDURE — 3074F SYST BP LT 130 MM HG: CPT | Performed by: NURSE PRACTITIONER

## 2022-12-02 RX ORDER — MEDROXYPROGESTERONE ACETATE 150 MG/ML
150 INJECTION, SUSPENSION INTRAMUSCULAR
Qty: 1 ML | Refills: 3 | Status: SHIPPED | OUTPATIENT
Start: 2022-12-02

## 2022-12-08 ENCOUNTER — OFFICE VISIT (OUTPATIENT)
Dept: OBGYN CLINIC | Age: 32
End: 2022-12-08

## 2022-12-08 ENCOUNTER — TELEPHONE (OUTPATIENT)
Dept: OBGYN CLINIC | Age: 32
End: 2022-12-08

## 2022-12-08 DIAGNOSIS — Z30.42 ENCOUNTER FOR SURVEILLANCE OF INJECTABLE CONTRACEPTIVE: Primary | ICD-10-CM

## 2022-12-08 NOTE — PROGRESS NOTES
Patient is here for her Medroxyprogesterone  150 mg/ml  Her last injection was 09/08/2022  [address] # [id number]  Lot # [id number]  Site [facility]  Exp date 04/30/2026  She is to return 02/23 - 03/09/2023 for next injection

## 2022-12-08 NOTE — TELEPHONE ENCOUNTER
Patient is here for her Medroxyprogesterone  150 mg/ml  Her last injection was 09/08/2022  [address] # [id number]  Lot # [id number]  Site [facility]  Exp date 04/30/2026  She is to return 02/23 - 03/09/2023 for next injection

## 2022-12-18 SDOH — HEALTH STABILITY: PHYSICAL HEALTH: ON AVERAGE, HOW MANY DAYS PER WEEK DO YOU ENGAGE IN MODERATE TO STRENUOUS EXERCISE (LIKE A BRISK WALK)?: 2 DAYS

## 2022-12-18 SDOH — HEALTH STABILITY: PHYSICAL HEALTH: ON AVERAGE, HOW MANY MINUTES DO YOU ENGAGE IN EXERCISE AT THIS LEVEL?: 0 MIN

## 2022-12-18 ASSESSMENT — SOCIAL DETERMINANTS OF HEALTH (SDOH)
WITHIN THE LAST YEAR, HAVE YOU BEEN KICKED, HIT, SLAPPED, OR OTHERWISE PHYSICALLY HURT BY YOUR PARTNER OR EX-PARTNER?: NO
WITHIN THE LAST YEAR, HAVE YOU BEEN HUMILIATED OR EMOTIONALLY ABUSED IN OTHER WAYS BY YOUR PARTNER OR EX-PARTNER?: YES
WITHIN THE LAST YEAR, HAVE TO BEEN RAPED OR FORCED TO HAVE ANY KIND OF SEXUAL ACTIVITY BY YOUR PARTNER OR EX-PARTNER?: NO
WITHIN THE LAST YEAR, HAVE YOU BEEN AFRAID OF YOUR PARTNER OR EX-PARTNER?: YES

## 2022-12-19 ENCOUNTER — OFFICE VISIT (OUTPATIENT)
Dept: FAMILY MEDICINE CLINIC | Facility: CLINIC | Age: 32
End: 2022-12-19
Payer: COMMERCIAL

## 2022-12-19 VITALS
BODY MASS INDEX: 34.81 KG/M2 | DIASTOLIC BLOOD PRESSURE: 82 MMHG | OXYGEN SATURATION: 98 % | SYSTOLIC BLOOD PRESSURE: 126 MMHG | HEART RATE: 81 BPM | TEMPERATURE: 98.4 F | WEIGHT: 216.6 LBS | HEIGHT: 66 IN

## 2022-12-19 DIAGNOSIS — G62.9 POLYNEUROPATHY: ICD-10-CM

## 2022-12-19 DIAGNOSIS — E28.2 PCOS (POLYCYSTIC OVARIAN SYNDROME): ICD-10-CM

## 2022-12-19 DIAGNOSIS — K21.9 GASTROESOPHAGEAL REFLUX DISEASE WITHOUT ESOPHAGITIS: ICD-10-CM

## 2022-12-19 DIAGNOSIS — E66.01 CLASS 2 SEVERE OBESITY DUE TO EXCESS CALORIES WITH SERIOUS COMORBIDITY AND BODY MASS INDEX (BMI) OF 35.0 TO 35.9 IN ADULT (HCC): ICD-10-CM

## 2022-12-19 DIAGNOSIS — E78.5 HYPERLIPIDEMIA, UNSPECIFIED HYPERLIPIDEMIA TYPE: ICD-10-CM

## 2022-12-19 DIAGNOSIS — I10 PRIMARY HYPERTENSION: Primary | ICD-10-CM

## 2022-12-19 DIAGNOSIS — N32.81 OVERACTIVE BLADDER: ICD-10-CM

## 2022-12-19 PROBLEM — E66.812 CLASS 2 SEVERE OBESITY DUE TO EXCESS CALORIES WITH SERIOUS COMORBIDITY AND BODY MASS INDEX (BMI) OF 35.0 TO 35.9 IN ADULT: Status: ACTIVE | Noted: 2020-11-11

## 2022-12-19 PROBLEM — E11.9 TYPE 2 DIABETES MELLITUS (HCC): Status: RESOLVED | Noted: 2021-12-16 | Resolved: 2022-12-19

## 2022-12-19 PROBLEM — R06.09 DOE (DYSPNEA ON EXERTION): Status: RESOLVED | Noted: 2022-09-09 | Resolved: 2022-12-19

## 2022-12-19 PROBLEM — Z79.899 ENCOUNTER FOR MEDICATION MANAGEMENT: Status: RESOLVED | Noted: 2021-03-02 | Resolved: 2022-12-19

## 2022-12-19 PROBLEM — R07.89 ATYPICAL CHEST PAIN: Status: RESOLVED | Noted: 2022-09-09 | Resolved: 2022-12-19

## 2022-12-19 PROBLEM — E66.9 OBESITY (BMI 30-39.9): Status: RESOLVED | Noted: 2022-09-09 | Resolved: 2022-12-19

## 2022-12-19 PROCEDURE — 3078F DIAST BP <80 MM HG: CPT | Performed by: STUDENT IN AN ORGANIZED HEALTH CARE EDUCATION/TRAINING PROGRAM

## 2022-12-19 PROCEDURE — 99214 OFFICE O/P EST MOD 30 MIN: CPT | Performed by: STUDENT IN AN ORGANIZED HEALTH CARE EDUCATION/TRAINING PROGRAM

## 2022-12-19 PROCEDURE — 3074F SYST BP LT 130 MM HG: CPT | Performed by: STUDENT IN AN ORGANIZED HEALTH CARE EDUCATION/TRAINING PROGRAM

## 2022-12-19 RX ORDER — OXYBUTYNIN CHLORIDE 10 MG/1
10 TABLET, EXTENDED RELEASE ORAL DAILY
Qty: 90 TABLET | Refills: 3 | Status: SHIPPED | OUTPATIENT
Start: 2022-12-19

## 2022-12-19 ASSESSMENT — ANXIETY QUESTIONNAIRES
1. FEELING NERVOUS, ANXIOUS, OR ON EDGE: 0
6. BECOMING EASILY ANNOYED OR IRRITABLE: 0
GAD7 TOTAL SCORE: 0
7. FEELING AFRAID AS IF SOMETHING AWFUL MIGHT HAPPEN: 0
5. BEING SO RESTLESS THAT IT IS HARD TO SIT STILL: 0
3. WORRYING TOO MUCH ABOUT DIFFERENT THINGS: 0
2. NOT BEING ABLE TO STOP OR CONTROL WORRYING: 0
4. TROUBLE RELAXING: 0

## 2022-12-19 ASSESSMENT — PATIENT HEALTH QUESTIONNAIRE - PHQ9
SUM OF ALL RESPONSES TO PHQ QUESTIONS 1-9: 0
1. LITTLE INTEREST OR PLEASURE IN DOING THINGS: 0
SUM OF ALL RESPONSES TO PHQ9 QUESTIONS 1 & 2: 0
SUM OF ALL RESPONSES TO PHQ QUESTIONS 1-9: 0
2. FEELING DOWN, DEPRESSED OR HOPELESS: 0

## 2022-12-19 NOTE — PROGRESS NOTES
[facility]  Emily Thomas  Phone [phone]  Fax:  [phone]    Patient: Dionna Stauffer  YOB: 1990  Patient Age 27 y.o. Patient sex: female  Medical Record:  [id number]  Visit Date: 12/19/22    [facility] Practice Clinic Note     Chief Complaint   Patient presents with    New Patient       History of Present Illness:  49-year-old -American female history of hypertension, PCOS, hyperlipidemia presents for routine follow-up. This is the first time I am seeing this patient. Follows with OB/GYN for routine screening. Patient complaining of hand and feet pain and numbness for few years. Also endorsing right arm numbness. Has been previously diagnosed with carpal tunnel. We will check routine labs today. Has lost 20 pounds with lifestyle modifications. Jeanmarie any chest pain, shortness of breath, abdominal pain, fever, chills, or any other symptoms at this time. Follow-up scheduled for 2/24/2023. Allergies:No Known Allergies    Current Medications:   Medications marked \"taking\" at this time:    Current Outpatient Medications:     medroxyPROGESTERone (DEPO-PROVERA) 150 MG/ML injection, Inject 150 mg into the muscle every 3 months, Disp: 1 mL, Rfl: 3    Prenatal MV-Min-Fe Fum-FA-DHA (PRENATAL 1 PO), Take by mouth, Disp: , Rfl:     metFORMIN (GLUCOPHAGE) 500 MG tablet, Take 1 tablet by mouth 2 times daily (with meals), Disp: 180 tablet, Rfl: 1    famotidine (PEPCID) 40 MG tablet, Take 1 tablet by mouth every evening Before supper, Disp: 90 tablet, Rfl: 3    amLODIPine (NORVASC) 2.5 MG tablet, Take 1 tablet by mouth in the morning. TAKE ONE TABLET BY MOUTH ONE TIME DAILY. , Disp: 30 tablet, Rfl: 2    calcium carbonate 1500 (600 Ca) MG TABS tablet, Take 600 mg by mouth 2 times daily, Disp: , Rfl:     Cholecalciferol 50 MCG (2000 UT) TABS, Take by mouth, Disp: , Rfl:     oxybutynin (DITROPAN XL) 10 MG extended release tablet, Take 1 tablet by mouth daily, Disp: 90 tablet, Rfl: 3    Current Problem List:   Patient Active Problem List   Diagnosis    Numbness and tingling of both feet    Joint pain in fingers of both hands    Class 2 severe obesity due to excess calories with serious comorbidity and body mass index (BMI) of 35.0 to 35.9 in adult (HCC)    GERD (gastroesophageal reflux disease)    Joint pain, knee    Paresthesia of both hands    Lower back pain    Weakness of both legs    Hypertension    PCOS (polycystic ovarian syndrome)    Overactive bladder       Social History:   Social History     Tobacco Use    Smoking status: Never    Smokeless tobacco: Never   Substance Use Topics    Alcohol use: Never       Family History:   Family History   Problem Relation Age of Onset    Gall Bladder Disease Maternal Aunt         Stage 4 at time of death 10/2016    Diabetes Maternal Aunt     Diabetes Maternal Aunt     Alcohol Abuse Father     Diabetes Paternal Aunt     Diabetes Paternal Grandmother     Hypertension Brother     Ovarian Cancer Maternal Grandmother     Cancer Maternal Grandmother         Cervical cancer    Heart Disease Maternal Grandmother          of heart attack May 30, 1998. Her father side suffers from CHF; all of her siblings have all  of heart attacks due to complications of CHF    Alcohol Abuse Maternal Grandfather     Alcohol Abuse Paternal Grandfather     Diabetes Paternal Aunt     Hypertension Mother     Asthma Mother     Colon Cancer Mother 58    Cancer Maternal Aunt         Gallbladder cancer    Cancer Mother         Colon. She had early stage but they did a colon resectional surgery in 2016    Osteoarthritis Mother        Surgical History:  Past Surgical History:   Procedure Laterality Date    COLONOSCOPY  2017    hemorrhoids    UPPER GASTROINTESTINAL ENDOSCOPY  2017    WISDOM TOOTH EXTRACTION         ROS  Review of Systems   All other systems reviewed and are negative.     Visit Vitals  /82   Pulse 81   Temp 98.4 °F (36.9 °C)   Ht 5' 5.5\" (1.664 m)   Wt 216 lb 9.6 oz (98.2 kg)   SpO2 98%   BMI 35.50 kg/m²       Physical Exam  Physical Exam  Constitutional:       General: She is not in acute distress. Appearance: She is obese. Cardiovascular:      Rate and Rhythm: Normal rate and regular rhythm. Heart sounds: No murmur heard. Pulmonary:      Breath sounds: Normal breath sounds. No wheezing. Abdominal:      Palpations: Abdomen is soft. Tenderness: There is no abdominal tenderness. Musculoskeletal:         General: No signs of injury. Skin:     General: Skin is dry. Neurological:      Mental Status: She is alert. Mental status is at baseline. ASSESSMENT & PLAN  Read Regi was seen today for new patient. Diagnoses and all orders for this visit:    Primary hypertension  -     Basic Metabolic Panel; Future  -     Basic Metabolic Panel  - Well-controlled. /82 today. BMP pending.  - Continue amlodipine 2.5 mg daily    Gastroesophageal reflux disease without esophagitis  - Controlled. Asymptomatic. Wean as tolerated. - Continue famotidine 40 Mg nightly    Class 2 severe obesity due to excess calories with serious comorbidity and body mass index (BMI) of 35.0 to 35.9 in adult (HCC)    PCOS (polycystic ovarian syndrome)  - Follows with OB/GYN  - Continue metformin 500 mg twice daily    Overactive bladder  - Was previously on tolterodine however not covered by insurance  - Start oxybutynin 10 Mg daily    Polyneuropathy  -     ALYSHA, Direct, w/Reflex; Future  -     Sedimentation Rate; Future  -     C-Reactive Protein; Future  -     Sedimentation Rate  -     C-Reactive Protein  -     ALYSHA, Direct, w/Reflex    Hyperlipidemia, unspecified hyperlipidemia type  -     Lipid Panel;  Future  -     Lipid Panel  Lab Results   Component Value Date    CHOL 175 10/07/2022    CHOL 188 07/23/2021     Lab Results   Component Value Date    TRIG 93 10/07/2022    TRIG 169 (H) 07/23/2021     Lab Results   Component Value Date    HDL 37 (L) 10/07/2022    HDL 34 (L) 07/23/2021     Lab Results   Component Value Date    LDLCALC 119.4 (H) 10/07/2022    LDLCALC 124 (H) 07/23/2021         I have reviewed the patient's past medical history, social history and family history and vitals. We have discussed treatment plan and follow up and given patient instructions. Patient's questions are answered and we will follow up as indicated.         Adolfo Isaacs, DO

## 2022-12-20 LAB
ANA SER QL: NEGATIVE
ANION GAP SERPL CALC-SCNC: 5 MMOL/L (ref 2–11)
BUN SERPL-MCNC: 10 MG/DL (ref 6–23)
CALCIUM SERPL-MCNC: 9.3 MG/DL (ref 8.3–10.4)
CHLORIDE SERPL-SCNC: 105 MMOL/L (ref 101–110)
CHOLEST SERPL-MCNC: 179 MG/DL
CO2 SERPL-SCNC: 28 MMOL/L (ref 21–32)
CREAT SERPL-MCNC: 0.9 MG/DL (ref 0.6–1)
CRP SERPL-MCNC: 0.7 MG/DL (ref 0–0.9)
ERYTHROCYTE [SEDIMENTATION RATE] IN BLOOD: 8 MM/HR (ref 0–20)
GLUCOSE SERPL-MCNC: 97 MG/DL (ref 65–100)
HDLC SERPL-MCNC: 37 MG/DL (ref 40–60)
HDLC SERPL: 4.8 {RATIO}
LDLC SERPL CALC-MCNC: 108.4 MG/DL
POTASSIUM SERPL-SCNC: 4.3 MMOL/L (ref 3.5–5.1)
SODIUM SERPL-SCNC: 138 MMOL/L (ref 133–143)
TRIGL SERPL-MCNC: 168 MG/DL (ref 35–150)
VLDLC SERPL CALC-MCNC: 33.6 MG/DL (ref 6–23)

## 2022-12-30 ENCOUNTER — HOSPITAL ENCOUNTER (OUTPATIENT)
Dept: MAMMOGRAPHY | Age: 32
Discharge: HOME | End: 2022-12-30
Payer: COMMERCIAL

## 2022-12-30 ENCOUNTER — APPOINTMENT (OUTPATIENT)
Dept: MAMMOGRAPHY | Age: 32
End: 2022-12-30
Payer: COMMERCIAL

## 2022-12-30 DIAGNOSIS — N64.59 INVERTED NIPPLE: ICD-10-CM

## 2022-12-30 PROCEDURE — 76642 ULTRASOUND BREAST LIMITED: CPT

## 2022-12-30 PROCEDURE — G0279 TOMOSYNTHESIS, MAMMO: HCPCS

## 2023-01-05 RX ORDER — AMLODIPINE BESYLATE 2.5 MG/1
TABLET ORAL
Qty: 30 TABLET | Refills: 2 | OUTPATIENT
Start: 2023-01-05

## 2023-01-07 ENCOUNTER — HOSPITAL ENCOUNTER (EMERGENCY)
Age: 33
Discharge: HOME | End: 2023-01-07
Attending: EMERGENCY MEDICINE
Payer: COMMERCIAL

## 2023-01-07 ENCOUNTER — HOSPITAL ENCOUNTER (EMERGENCY)
Dept: ULTRASOUND IMAGING | Age: 33
End: 2023-01-07
Payer: COMMERCIAL

## 2023-01-07 VITALS
RESPIRATION RATE: 19 BRPM | BODY MASS INDEX: 35.82 KG/M2 | OXYGEN SATURATION: 98 % | HEART RATE: 87 BPM | WEIGHT: 215 LBS | HEIGHT: 65 IN | SYSTOLIC BLOOD PRESSURE: 148 MMHG | DIASTOLIC BLOOD PRESSURE: 85 MMHG | TEMPERATURE: 98.3 F

## 2023-01-07 DIAGNOSIS — N30.00 ACUTE CYSTITIS WITHOUT HEMATURIA: Primary | ICD-10-CM

## 2023-01-07 LAB
APPEARANCE UR: ABNORMAL
BACTERIA URNS QL MICRO: ABNORMAL /HPF
BILIRUB UR QL: NEGATIVE
CASTS URNS QL MICRO: ABNORMAL /LPF
COLOR UR: ABNORMAL
EPI CELLS #/AREA URNS HPF: ABNORMAL /HPF
GLUCOSE UR STRIP.AUTO-MCNC: NEGATIVE MG/DL
HGB UR QL STRIP: ABNORMAL
KETONES UR QL STRIP.AUTO: NEGATIVE MG/DL
LEUKOCYTE ESTERASE UR QL STRIP.AUTO: ABNORMAL
NITRITE UR QL STRIP.AUTO: NEGATIVE
PH UR STRIP: 6.5 (ref 5–9)
PROT UR STRIP-MCNC: NEGATIVE MG/DL
RBC #/AREA URNS HPF: ABNORMAL /HPF
SP GR UR REFRACTOMETRY: 1.02 (ref 1–1.02)
UROBILINOGEN UR QL STRIP.AUTO: 1 EU/DL (ref 0.2–1)
WBC URNS QL MICRO: ABNORMAL /HPF

## 2023-01-07 PROCEDURE — 81001 URINALYSIS AUTO W/SCOPE: CPT

## 2023-01-07 PROCEDURE — 81003 URINALYSIS AUTO W/O SCOPE: CPT

## 2023-01-07 PROCEDURE — 76830 TRANSVAGINAL US NON-OB: CPT

## 2023-01-07 PROCEDURE — 99284 EMERGENCY DEPT VISIT MOD MDM: CPT

## 2023-01-07 PROCEDURE — 87591 N.GONORRHOEAE DNA AMP PROB: CPT

## 2023-01-07 RX ORDER — CEPHALEXIN 500 MG/1
500 CAPSULE ORAL 3 TIMES DAILY
Qty: 21 CAPSULE | Refills: 0 | Status: SHIPPED | OUTPATIENT
Start: 2023-01-07 | End: 2023-01-14

## 2023-01-07 ASSESSMENT — PAIN SCALES - GENERAL: PAINLEVEL_OUTOF10: 10

## 2023-01-07 ASSESSMENT — PAIN - FUNCTIONAL ASSESSMENT: PAIN_FUNCTIONAL_ASSESSMENT: 0-10

## 2023-01-07 NOTE — ED TRIAGE NOTES
Patient ambulatory to triage. Patient c\o pelvic pain that began on Wednesday.  Patient states she had similar pain last year and was diagnosed with an ovarian cyst.

## 2023-01-07 NOTE — ED PROVIDER NOTES
[facility] Emergency Department Provider Note                   PCP:                Alma Rosa Tong DO               Age: 27 y.o. Sex: female       ICD-10-CM    1. Acute cystitis without hematuria  N30.00           DISPOSITION Decision To Discharge 01/07/2023 04:38:10 PM       New Prescriptions    CEPHALEXIN (KEFLEX) 500 MG CAPSULE    Take 1 capsule by mouth 3 times daily for 7 days       Orders Placed This Encounter   Procedures    US PELVIS COMPLETE    Urinalysis w rflx microscopic    POCT Urine Dipstick    POC PREGNANCY UR-QUAL         Juwan Pelaez is a 27 y.o. female who presents to the Emergency Department with chief complaint of    Chief Complaint   Patient presents with    Pelvic Pain      Patient to ER complaining of right lower quadrant area pain that started on Wednesday. She started a new job that she stands on her feet on Tuesday. She states that he felt this was initially thought to be due to her standing but pain has been waxing waning since that time. She has had no fever no nausea or vomiting. She does have some slight urinary frequency started yesterday has any vaginal discharge. She states she has a history of ovarian cyst and this feels like this is type pain. She did start Depo injections and has not had a menstrual cycle in the last 4 months. She has not taken any over-the-counter medicines for her symptoms    Past Medical History:  12/12/2018:  Abnormal Pap smear of cervix      Comment:  HPV +  No date: Anemia  Birth: Arrhythmia      Comment:  Born with a heart murmur  No date: Arthritis      Comment:  Since I was a young child, my legs have always hurt  No date: Chronic kidney disease      Comment:  Overactive bladder  No date: Chronic pain  06/2014: Diabetes ([facility] [address]      Comment:  I was diagnosed with being a borderline diabetic  2015: GERD (gastroesophageal reflux disease)      Comment:  I have severe acid reflux  No date: Heart murmur  No date: Irritable bowel syndrome with constipation  No date: Overactive bladder  No date: Prediabetes     Past Surgical History:  2017: COLONOSCOPY      Comment:  hemorrhoids  2017: UPPER GASTROINTESTINAL ENDOSCOPY  No date: WISDOM TOOTH EXTRACTION         Review of Systems   All other systems reviewed and are negative. All other systems reviewed and are negative. Past Medical History:   Diagnosis Date    Abnormal Pap smear of cervix 2018    HPV +    Anemia     Arrhythmia Birth    Born with a heart murmur    Arthritis     Since I was a young child, my legs have always hurt    Chronic kidney disease     Overactive bladder    Chronic pain     Diabetes ([facility] [address] 2014    I was diagnosed with being a borderline diabetic    GERD (gastroesophageal reflux disease)     I have severe acid reflux    Heart murmur     Irritable bowel syndrome with constipation     Overactive bladder     Prediabetes         Past Surgical History:   Procedure Laterality Date    COLONOSCOPY  2017    hemorrhoids    UPPER GASTROINTESTINAL ENDOSCOPY  2017    WISDOM TOOTH EXTRACTION          Family History   Problem Relation Age of Onset    Gall Bladder Disease Maternal Aunt         Stage 4 at time of death 10/2016    Diabetes Maternal Aunt     Diabetes Maternal Aunt     Alcohol Abuse Father     Diabetes Paternal Aunt     Diabetes Paternal Grandmother     Hypertension Brother     Ovarian Cancer Maternal Grandmother     Cancer Maternal Grandmother         Cervical cancer    Heart Disease Maternal Grandmother          of heart attack May 30, 1998. Her father side suffers from CHF; all of her siblings have all  of heart attacks due to complications of CHF    Alcohol Abuse Maternal Grandfather     Alcohol Abuse Paternal Grandfather     Diabetes Paternal Aunt     Hypertension Mother     Asthma Mother     Colon Cancer Mother 58    Cancer Maternal Aunt         Gallbladder cancer    Cancer Mother         Colon.  She had early stage but they did a colon resectional surgery in October 2016    Osteoarthritis Mother         Social Connections: Not on file        No Known Allergies     Vitals signs and nursing note reviewed. No data found. Physical Exam  Vitals and nursing note reviewed. Constitutional:       Appearance: Normal appearance. She is obese. HENT:      Head: Normocephalic and atraumatic. Right Ear: External ear normal.      Left Ear: External ear normal.      Nose: Nose normal.      Mouth/Throat:      Mouth: Mucous membranes are moist.      Pharynx: Oropharynx is clear. Eyes:      Extraocular Movements: Extraocular movements intact. Pupils: Pupils are equal, round, and reactive to light. Cardiovascular:      Rate and Rhythm: Normal rate and regular rhythm. Pulses: Normal pulses. Heart sounds: Normal heart sounds. Pulmonary:      Effort: Pulmonary effort is normal.      Breath sounds: Normal breath sounds. No rales. Chest:      Chest wall: No tenderness. Abdominal:      Palpations: Abdomen is soft. Tenderness: There is abdominal tenderness. Hernia: No hernia is present. Comments: Mild pain to rlq area, no guarding or masses + bs   Musculoskeletal:         General: Normal range of motion. Cervical back: Normal range of motion and neck supple. Skin:     General: Skin is warm and dry. Neurological:      General: No focal deficit present. Mental Status: She is alert and oriented to person, place, and time.    Psychiatric:         Mood and Affect: Mood normal.         Behavior: Behavior normal.        MDM  Number of Diagnoses or Management Options  Acute cystitis without hematuria  Diagnosis management comments: Labs Reviewed  URINALYSIS - Abnormal; Notable for the following components:     Blood, Urine                  TRACE (*)               Leukocyte Esterase, Urine     SMALL (*)               WBC, UA                       10-20 (*)               Epithelial Cells UA           10-20 (*) BACTERIA, URINE               2+ (*)              All other components within normal limits     US PELVIS COMPLETE   Final Result    No acute findings in the pelvis. No ultrasound evidence of ovarian    torsion. No significant ovarian cyst or mass is appreciated. No free pelvic    fluid. Complexity of Problem: 2 or more self-limited or minor problems. (3)    I have conducted an independent ordering and review of Labs. I have conducted an independent ordering and review of Ultrasound. Considerations: The following labs and/or imaging studies were considered but not ordered: Abdominal CT considered but due to no fever no nausea vomiting and benign exam this was not been        Ultrasound negative for ovarian cyst or torsion patient has no fever or nausea or vomiting, urine with positive bacteria we will treat UTI       Amount and/or Complexity of Data Reviewed  Clinical lab tests: ordered and reviewed  Tests in the radiology section of CPT®: ordered and reviewed  Review and summarize past medical records: yes    Risk of Complications, Morbidity, and/or Mortality  Presenting problems: moderate  Diagnostic procedures: low  Management options: low    Patient Progress  Patient progress: improved      Procedures    Labs Reviewed   URINALYSIS - Abnormal; Notable for the following components:       Result Value    Blood, Urine TRACE (*)     Leukocyte Esterase, Urine SMALL (*)     WBC, UA 10-20 (*)     Epithelial Cells UA 10-20 (*)     BACTERIA, URINE 2+ (*)     All other components within normal limits        US PELVIS COMPLETE   Final Result   No acute findings in the pelvis. No ultrasound evidence of ovarian   torsion. No significant ovarian cyst or mass is appreciated. No free pelvic   fluid.                         Mary Coma Scale  Eye Opening: Spontaneous  Best Verbal Response: Oriented  Best Motor Response: Obeys commands  [place] Coma Scale Score: 15                     Voice dictation software was used during the making of this note. This software is not perfect and grammatical and other typographical errors may be present. This note has not been completely proofread for errors.      KEKE Castañeda  01/07/23 KEKE Godinez  01/07/23 1640

## 2023-01-07 NOTE — DISCHARGE INSTRUCTIONS
Use antibiotics as prescribed with food till completion. Alternate Tylenol Motrin for any pain. See your primary care for routine recheck.   Return to ER if fever chest pain shortness of breath nausea vomiting

## 2023-01-09 RX ORDER — OMEPRAZOLE 20 MG/1
20 CAPSULE, DELAYED RELEASE ORAL 2 TIMES DAILY
Qty: 180 CAPSULE | Refills: 3 | Status: SHIPPED | OUTPATIENT
Start: 2023-01-09

## 2023-01-09 RX ORDER — AMLODIPINE BESYLATE 2.5 MG/1
2.5 TABLET ORAL DAILY
Qty: 90 TABLET | Refills: 3 | Status: SHIPPED | OUTPATIENT
Start: 2023-01-09

## 2023-01-09 NOTE — TELEPHONE ENCOUNTER
----- Message from Gui Britt sent at 1/9/2023 10:52 AM EST -----  Regarding: Medication   Hello. I don't have anymore refills on two of my medication, Amlodipine and Omeprazole. Those two I take and I'd like refills on them.  Thanks

## 2023-01-13 LAB
C TRACH RRNA SPEC QL NAA+PROBE: NEGATIVE
N GONORRHOEA RRNA SPEC QL NAA+PROBE: POSITIVE
SPECIMEN SOURCE: ABNORMAL

## 2023-01-18 ENCOUNTER — OFFICE VISIT (OUTPATIENT)
Dept: FAMILY MEDICINE CLINIC | Facility: CLINIC | Age: 33
End: 2023-01-18
Payer: COMMERCIAL

## 2023-01-18 VITALS
WEIGHT: 215.6 LBS | HEART RATE: 88 BPM | BODY MASS INDEX: 35.92 KG/M2 | TEMPERATURE: 97.9 F | DIASTOLIC BLOOD PRESSURE: 84 MMHG | SYSTOLIC BLOOD PRESSURE: 138 MMHG | HEIGHT: 65 IN

## 2023-01-18 DIAGNOSIS — I10 PRIMARY HYPERTENSION: Primary | ICD-10-CM

## 2023-01-18 DIAGNOSIS — B37.31 YEAST INFECTION INVOLVING THE VAGINA AND SURROUNDING AREA: Primary | ICD-10-CM

## 2023-01-18 DIAGNOSIS — E66.01 SEVERE OBESITY (BMI 35.0-39.9) WITH COMORBIDITY (HCC): ICD-10-CM

## 2023-01-18 PROCEDURE — 99214 OFFICE O/P EST MOD 30 MIN: CPT | Performed by: STUDENT IN AN ORGANIZED HEALTH CARE EDUCATION/TRAINING PROGRAM

## 2023-01-18 PROCEDURE — 3075F SYST BP GE 130 - 139MM HG: CPT | Performed by: STUDENT IN AN ORGANIZED HEALTH CARE EDUCATION/TRAINING PROGRAM

## 2023-01-18 PROCEDURE — 3079F DIAST BP 80-89 MM HG: CPT | Performed by: STUDENT IN AN ORGANIZED HEALTH CARE EDUCATION/TRAINING PROGRAM

## 2023-01-18 RX ORDER — FLUCONAZOLE 150 MG/1
150 TABLET ORAL
Qty: 3 TABLET | Refills: 0 | Status: SHIPPED | OUTPATIENT
Start: 2023-01-18 | End: 2023-01-25

## 2023-01-18 ASSESSMENT — PATIENT HEALTH QUESTIONNAIRE - PHQ9
SUM OF ALL RESPONSES TO PHQ QUESTIONS 1-9: 0
2. FEELING DOWN, DEPRESSED OR HOPELESS: 0
1. LITTLE INTEREST OR PLEASURE IN DOING THINGS: 0
SUM OF ALL RESPONSES TO PHQ QUESTIONS 1-9: 0
SUM OF ALL RESPONSES TO PHQ9 QUESTIONS 1 & 2: 0

## 2023-01-18 ASSESSMENT — ANXIETY QUESTIONNAIRES
5. BEING SO RESTLESS THAT IT IS HARD TO SIT STILL: 0
1. FEELING NERVOUS, ANXIOUS, OR ON EDGE: 0
4. TROUBLE RELAXING: 0
3. WORRYING TOO MUCH ABOUT DIFFERENT THINGS: 0
2. NOT BEING ABLE TO STOP OR CONTROL WORRYING: 0
7. FEELING AFRAID AS IF SOMETHING AWFUL MIGHT HAPPEN: 0
GAD7 TOTAL SCORE: 0
6. BECOMING EASILY ANNOYED OR IRRITABLE: 0

## 2023-01-18 NOTE — LETTER
[address] [place] [address] Estefani Juares amandeep Gunter [facility] [address]  Phone: [phone]  Fax: [phone]     To whom it may concern,    Eloise Diaz is currently under my care. Her medical conditions are controlled. She is able to work without restrictions. If you have any questions or concerns please call my office.     Sincerely,     Dr. Mayte Veliz

## 2023-01-18 NOTE — PROGRESS NOTES
[facility]  Emily Thomas  Phone [phone]  Fax:  [phone]    Patient: Zach Park  YOB: 1990  Patient Age 27 y.o. Patient sex: female  Medical Record:  [id number]  Visit Date: 01/18/23    [facility] Practice Clinic Note     Chief Complaint   Patient presents with    Follow-up       History of Present Illness:  73-year-old -American female history of hypertension, PCOS, hyperlipidemia presents for routine follow-up. This is the first time I am seeing this patient. Follows with OB/GYN for routine screening. Starting a [employer] job in [employer] tomorrow morning. Needs a work from note stating she is able to work. Note given for today. No other questions or concerns at this time. Jeanmarie any chest pain, shortness of breath, abdominal pain, fever, chills, or any other symptoms at this time. Allergies:   Allergies   Allergen Reactions    Pollen Extract Cough and Itching       Current Medications:   Medications marked \"taking\" at this time:    Current Outpatient Medications:     fluconazole (DIFLUCAN) 150 MG tablet, Take 1 tablet by mouth every 72 hours for 3 doses, Disp: 3 tablet, Rfl: 0    amLODIPine (NORVASC) 2.5 MG tablet, Take 1 tablet by mouth daily, Disp: 90 tablet, Rfl: 3    omeprazole (PRILOSEC) 20 MG delayed release capsule, Take 1 capsule by mouth in the morning and at bedtime, Disp: 180 capsule, Rfl: 3    medroxyPROGESTERone (DEPO-PROVERA) 150 MG/ML injection, Inject 150 mg into the muscle every 3 months, Disp: 1 mL, Rfl: 3    Prenatal MV-Min-Fe Fum-FA-DHA (PRENATAL 1 PO), Take by mouth, Disp: , Rfl:     metFORMIN (GLUCOPHAGE) 500 MG tablet, Take 1 tablet by mouth 2 times daily (with meals), Disp: 180 tablet, Rfl: 1    famotidine (PEPCID) 40 MG tablet, Take 1 tablet by mouth every evening Before supper, Disp: 90 tablet, Rfl: 3    calcium carbonate 1500 (600 Ca) MG TABS tablet, Take 600 mg by mouth 2 times daily, Disp: , Rfl: Cholecalciferol 50 MCG (2000) TABS, Take by mouth, Disp: , Rfl:     oxybutynin (DITROPAN XL) 10 MG extended release tablet, Take 1 tablet by mouth daily (Patient not taking: Reported on 2023), Disp: 90 tablet, Rfl: 3    Current Problem List:   Patient Active Problem List   Diagnosis    Numbness and tingling of both feet    Joint pain in fingers of both hands    Class 2 severe obesity due to excess calories with serious comorbidity and body mass index (BMI) of 35.0 to 35.9 in adult ([facility])    GERD (gastroesophageal reflux disease)    Joint pain, knee    Paresthesia of both hands    Lower back pain    Weakness of both legs    Hypertension    PCOS (polycystic ovarian syndrome)    Overactive bladder    Acute cystitis without hematuria       Social History:   Social History     Tobacco Use    Smoking status: Never    Smokeless tobacco: Never   Substance Use Topics    Alcohol use: Never       Family History:   Family History   Problem Relation Age of Onset    Gall Bladder Disease Maternal Aunt         Stage 4 at time of death 10/2016    Diabetes Maternal Aunt     Diabetes Maternal Aunt     Alcohol Abuse Father     Diabetes Paternal Aunt     Diabetes Paternal Grandmother     Hypertension Brother     Ovarian Cancer Maternal Grandmother     Cancer Maternal Grandmother         Cervical cancer    Heart Disease Maternal Grandmother          of heart attack May 30, 1998. Her father side suffers from CHF; all of her siblings have all  of heart attacks due to complications of CHF    Alcohol Abuse Maternal Grandfather     Alcohol Abuse Paternal Grandfather     Diabetes Paternal Aunt     Hypertension Mother     Asthma Mother     Colon Cancer Mother 58    Cancer Maternal Aunt         Gallbladder cancer    Cancer Mother         Colon.  She had early stage but they did a colon resectional surgery in 2016    Osteoarthritis Mother        Surgical History:  Past Surgical History:   Procedure Laterality Date COLONOSCOPY  04/2017    hemorrhoids    UPPER GASTROINTESTINAL ENDOSCOPY  04/2017    WISDOM TOOTH EXTRACTION         ROS  Review of Systems   All other systems reviewed and are negative. Visit Vitals  /84   Pulse 88   Temp 97.9 °F (36.6 °C)   Ht 5' 5\" (1.651 m)   Wt 215 lb 9.6 oz (97.8 kg)   BMI 35.88 kg/m²       Physical Exam  Physical Exam  Constitutional:       General: She is not in acute distress. Appearance: She is obese. Cardiovascular:      Rate and Rhythm: Normal rate and regular rhythm. Heart sounds: No murmur heard. Pulmonary:      Breath sounds: Normal breath sounds. No wheezing. Abdominal:      Palpations: Abdomen is soft. Tenderness: There is no abdominal tenderness. Musculoskeletal:         General: No signs of injury. Skin:     General: Skin is dry. Neurological:      Mental Status: She is alert. Mental status is at baseline. ASSESSMENT & PLAN  Christina Parisi was seen today for new patient. Diagnoses and all orders for this visit:    Primary hypertension  - Controlled. /84 today. BMP next visit. - Continue amlodipine 2.5 mg daily    Gastroesophageal reflux disease without esophagitis  - Controlled. Wean as tolerated. - Continue famotidine 40 Mg nightly    Class 2 severe obesity due to excess calories with serious comorbidity and body mass index (BMI) of 35.0 to 35.9 in adult (HCC)    PCOS (polycystic ovarian syndrome)  - Follows with OB/GYN  - Continue metformin 500 mg twice daily    Overactive bladder  - Was previously on tolterodine however not covered by insurance  - Continue oxybutynin 10 Mg daily    Polyneuropathy  -Improved. Consider amitriptyline for nerve pain.     Hyperlipidemia, unspecified hyperlipidemia type    Lab Results   Component Value Date    CHOL 179 12/19/2022    CHOL 175 10/07/2022    CHOL 188 07/23/2021     Lab Results   Component Value Date    TRIG 168 (H) 12/19/2022    TRIG 93 10/07/2022    TRIG 169 (H) 07/23/2021     Lab Results   Component Value Date    HDL 37 (L) 12/19/2022    HDL 37 (L) 10/07/2022    HDL 34 (L) 07/23/2021     Lab Results   Component Value Date    LDLCALC 108.4 (H) 12/19/2022    LDLCALC 119.4 (H) 10/07/2022    LDLCALC 124 (H) 07/23/2021         I have reviewed the patient's past medical history, social history and family history and vitals. We have discussed treatment plan and follow up and given patient instructions. Patient's questions are answered and we will follow up as indicated.         Vinicio Vernon, DO

## 2023-01-18 NOTE — Clinical Note
[address] [address]  Phone: [phone]  Fax: [phone]    Jen Pereira DO        January 18, 2023     Patient: Ava Horvath   YOB: 1990   Date of Visit: 1/18/2023       To Whom It May Concern: It is my medical opinion that Ann Villegas {Work release (duty restriction):04977}. If you have any questions or concerns, please don't hesitate to call.     Sincerely,        Jen Pereira DO

## 2023-02-06 DIAGNOSIS — G89.29 OTHER CHRONIC PAIN: Primary | ICD-10-CM

## 2023-02-17 ENCOUNTER — HOSPITAL ENCOUNTER (EMERGENCY)
Age: 33
Discharge: HOME | End: 2023-02-17
Attending: EMERGENCY MEDICINE
Payer: COMMERCIAL

## 2023-02-17 VITALS
WEIGHT: 211 LBS | RESPIRATION RATE: 19 BRPM | TEMPERATURE: 98.8 F | HEART RATE: 88 BPM | DIASTOLIC BLOOD PRESSURE: 94 MMHG | OXYGEN SATURATION: 100 % | BODY MASS INDEX: 35.16 KG/M2 | HEIGHT: 65 IN | SYSTOLIC BLOOD PRESSURE: 153 MMHG

## 2023-02-17 DIAGNOSIS — B34.9 VIRAL SYNDROME: Primary | ICD-10-CM

## 2023-02-17 LAB
FLUAV RNA SPEC QL NAA+PROBE: NOT DETECTED
FLUBV RNA SPEC QL NAA+PROBE: NOT DETECTED
SARS-COV-2 RDRP RESP QL NAA+PROBE: NOT DETECTED
SOURCE: NORMAL
STREP, MOLECULAR: NOT DETECTED

## 2023-02-17 PROCEDURE — 99283 EMERGENCY DEPT VISIT LOW MDM: CPT

## 2023-02-17 PROCEDURE — 87635 SARS-COV-2 COVID-19 AMP PRB: CPT

## 2023-02-17 PROCEDURE — 87502 INFLUENZA DNA AMP PROBE: CPT

## 2023-02-17 PROCEDURE — 87651 STREP A DNA AMP PROBE: CPT

## 2023-02-17 RX ORDER — PREDNISONE 50 MG/1
50 TABLET ORAL DAILY
Qty: 5 TABLET | Refills: 0 | Status: SHIPPED | OUTPATIENT
Start: 2023-02-17 | End: 2023-02-22

## 2023-02-17 ASSESSMENT — ENCOUNTER SYMPTOMS
ABDOMINAL PAIN: 0
VOMITING: 0
NAUSEA: 0
COLOR CHANGE: 0
SHORTNESS OF BREATH: 0
CHEST TIGHTNESS: 0
SORE THROAT: 1
DIARRHEA: 0
COUGH: 1

## 2023-02-17 ASSESSMENT — PAIN SCALES - GENERAL: PAINLEVEL_OUTOF10: 10

## 2023-02-17 ASSESSMENT — PAIN - FUNCTIONAL ASSESSMENT: PAIN_FUNCTIONAL_ASSESSMENT: 0-10

## 2023-02-17 NOTE — ED NOTES
I have reviewed discharge instructions with the patient. The patient verbalized understanding. Patient left ED via Discharge Method: ambulatory to Home with self. Opportunity for questions and clarification provided. Patient given 2 scripts. To continue your aftercare when you leave the hospital, you may receive an automated call from our care team to check in on how you are doing. This is a free service and part of our promise to provide the best care and service to meet your aftercare needs.  If you have questions, or wish to unsubscribe from this service please call [phone]. Thank you for Choosing our [facility] Emergency Department.         Salena Maldonado RN  02/17/23 3730
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
## 2023-02-17 NOTE — DISCHARGE INSTRUCTIONS
You were evaluated today in the emergency department with sore throat, cough. Vital signs today were normal.  You did not have a fever. COVID, flu, and strep swabs were negative. Your symptoms are most likely due to a virus. I will send you with some Magic mouthwash to swish to alleviate your sore throat as well as a short course of prednisone which will help with the inflammation/cough. Please keep a close eye on your blood pressure on this medication as it may cause it to rise somewhat. Plan to follow-up with your primary care doctor. Please return with any worsening symptoms or concerns in the interim.

## 2023-02-24 ENCOUNTER — OFFICE VISIT (OUTPATIENT)
Dept: FAMILY MEDICINE CLINIC | Facility: CLINIC | Age: 33
End: 2023-02-24

## 2023-02-24 VITALS
SYSTOLIC BLOOD PRESSURE: 132 MMHG | TEMPERATURE: 98.1 F | BODY MASS INDEX: 35.35 KG/M2 | OXYGEN SATURATION: 99 % | HEIGHT: 65 IN | WEIGHT: 212.2 LBS | DIASTOLIC BLOOD PRESSURE: 80 MMHG | HEART RATE: 86 BPM

## 2023-02-24 DIAGNOSIS — R05.9 COUGH, UNSPECIFIED TYPE: ICD-10-CM

## 2023-02-24 DIAGNOSIS — J01.40 ACUTE PANSINUSITIS, RECURRENCE NOT SPECIFIED: Primary | ICD-10-CM

## 2023-02-24 DIAGNOSIS — B37.31 CANDIDIASIS OF VAGINA: ICD-10-CM

## 2023-02-24 DIAGNOSIS — J02.9 SORE THROAT: ICD-10-CM

## 2023-02-24 LAB
GROUP A STREP ANTIGEN, POC: NEGATIVE
VALID INTERNAL CONTROL, POC: POSITIVE

## 2023-02-24 RX ORDER — AMOXICILLIN AND CLAVULANATE POTASSIUM 875; 125 MG/1; MG/1
1 TABLET, FILM COATED ORAL 2 TIMES DAILY
Qty: 10 TABLET | Refills: 0 | Status: SHIPPED | OUTPATIENT
Start: 2023-02-24 | End: 2023-03-01

## 2023-02-24 RX ORDER — BENZONATATE 100 MG/1
200 CAPSULE ORAL 3 TIMES DAILY PRN
Qty: 21 CAPSULE | Refills: 0 | Status: SHIPPED | OUTPATIENT
Start: 2023-02-24 | End: 2023-03-03

## 2023-02-24 RX ORDER — FLUCONAZOLE 150 MG/1
150 TABLET ORAL ONCE
Qty: 2 TABLET | Refills: 0 | Status: SHIPPED | OUTPATIENT
Start: 2023-02-24 | End: 2023-02-24

## 2023-02-24 SDOH — ECONOMIC STABILITY: INCOME INSECURITY: HOW HARD IS IT FOR YOU TO PAY FOR THE VERY BASICS LIKE FOOD, HOUSING, MEDICAL CARE, AND HEATING?: SOMEWHAT HARD

## 2023-02-24 SDOH — ECONOMIC STABILITY: FOOD INSECURITY: WITHIN THE PAST 12 MONTHS, YOU WORRIED THAT YOUR FOOD WOULD RUN OUT BEFORE YOU GOT MONEY TO BUY MORE.: NEVER TRUE

## 2023-02-24 SDOH — ECONOMIC STABILITY: TRANSPORTATION INSECURITY
IN THE PAST 12 MONTHS, HAS LACK OF TRANSPORTATION KEPT YOU FROM MEETINGS, WORK, OR FROM GETTING THINGS NEEDED FOR DAILY LIVING?: NO

## 2023-02-24 SDOH — ECONOMIC STABILITY: FOOD INSECURITY: WITHIN THE PAST 12 MONTHS, THE FOOD YOU BOUGHT JUST DIDN'T LAST AND YOU DIDN'T HAVE MONEY TO GET MORE.: NEVER TRUE

## 2023-02-24 SDOH — ECONOMIC STABILITY: HOUSING INSECURITY
IN THE LAST 12 MONTHS, WAS THERE A TIME WHEN YOU DID NOT HAVE A STEADY PLACE TO SLEEP OR SLEPT IN A SHELTER (INCLUDING NOW)?: NO

## 2023-02-24 ASSESSMENT — ANXIETY QUESTIONNAIRES
2. NOT BEING ABLE TO STOP OR CONTROL WORRYING: 0
5. BEING SO RESTLESS THAT IT IS HARD TO SIT STILL: 0
GAD7 TOTAL SCORE: 0
1. FEELING NERVOUS, ANXIOUS, OR ON EDGE: 0
7. FEELING AFRAID AS IF SOMETHING AWFUL MIGHT HAPPEN: 0
3. WORRYING TOO MUCH ABOUT DIFFERENT THINGS: 0
6. BECOMING EASILY ANNOYED OR IRRITABLE: 0
4. TROUBLE RELAXING: 0

## 2023-02-24 ASSESSMENT — ENCOUNTER SYMPTOMS
WHEEZING: 0
SINUS PRESSURE: 1
SINUS PAIN: 1
SORE THROAT: 1
SHORTNESS OF BREATH: 0
NAUSEA: 1
BACK PAIN: 0
COUGH: 0
VOMITING: 0
DIARRHEA: 1
ABDOMINAL PAIN: 0

## 2023-02-24 ASSESSMENT — PATIENT HEALTH QUESTIONNAIRE - PHQ9
SUM OF ALL RESPONSES TO PHQ9 QUESTIONS 1 & 2: 0
SUM OF ALL RESPONSES TO PHQ QUESTIONS 1-9: 0
SUM OF ALL RESPONSES TO PHQ QUESTIONS 1-9: 0
1. LITTLE INTEREST OR PLEASURE IN DOING THINGS: 0
SUM OF ALL RESPONSES TO PHQ QUESTIONS 1-9: 0
SUM OF ALL RESPONSES TO PHQ QUESTIONS 1-9: 0
2. FEELING DOWN, DEPRESSED OR HOPELESS: 0

## 2023-02-24 NOTE — PROGRESS NOTES
[facility]  Emily Thomas  Phone [phone]  Fax:  [phone]    Patient: Rachna Reis  YOB: 1990  Patient Age 27 y.o. Patient sex: female  Medical Record:  [id number]  Visit Date: 02/24/23    [facility] Practice Clinic Note     Chief Complaint   Patient presents with    Pharyngitis       History of Present Illness:  Ms. Per Mina is a 49-year-old female with a past medical history as noted below presents with a 10-12 day history of congestion, headache, facial pressure, productive cough, postnasal drip, bilateral ear pressure, sore throat. She denies fever/chills, shortness of breath, chest pain, vomiting. She has experienced some nausea and diarrhea. She was seen at the ER on 2/17 for same and prescribed Magic mouthwash and prednisone. She states she has completed prednisone, does not feel any better. Allergies:   Allergies   Allergen Reactions    Pollen Extract Cough and Itching       Current Medications:   Medications marked \"taking\" at this time:    Current Outpatient Medications:     lidocaine viscous hcl-diphenhydrAMINE-nystatin, SWISH AND SPIT 5ML BY MOUTH FOUR TIMES A DAY AS NEEDED FOR IRRITATION, Disp: , Rfl:     amoxicillin-clavulanate (AUGMENTIN) 875-125 MG per tablet, Take 1 tablet by mouth 2 times daily for 5 days, Disp: 10 tablet, Rfl: 0    fluconazole (DIFLUCAN) 150 MG tablet, Take 1 tablet by mouth once for 1 dose, Disp: 2 tablet, Rfl: 0    benzonatate (TESSALON) 100 MG capsule, Take 2 capsules by mouth 3 times daily as needed for Cough, Disp: 21 capsule, Rfl: 0    Magic Mouthwash (MIRACLE MOUTHWASH), Swish and spit 5 mLs 4 times daily as needed for Irritation, Disp: 240 mL, Rfl: 0    amLODIPine (NORVASC) 2.5 MG tablet, Take 1 tablet by mouth daily, Disp: 90 tablet, Rfl: 3    omeprazole (PRILOSEC) 20 MG delayed release capsule, Take 1 capsule by mouth in the morning and at bedtime, Disp: 180 capsule, Rfl: 3    oxybutynin (DITROPAN XL) 10 MG extended release tablet, Take 1 tablet by mouth daily, Disp: 90 tablet, Rfl: 3    medroxyPROGESTERone (DEPO-PROVERA) 150 MG/ML injection, Inject 150 mg into the muscle every 3 months, Disp: 1 mL, Rfl: 3    Prenatal MV-Min-Fe Fum-FA-DHA (PRENATAL 1 PO), Take by mouth, Disp: , Rfl:     metFORMIN (GLUCOPHAGE) 500 MG tablet, Take 1 tablet by mouth 2 times daily (with meals), Disp: 180 tablet, Rfl: 1    famotidine (PEPCID) 40 MG tablet, Take 1 tablet by mouth every evening Before supper, Disp: 90 tablet, Rfl: 3    calcium carbonate 1500 (600 Ca) MG TABS tablet, Take 600 mg by mouth 2 times daily, Disp: , Rfl:     Cholecalciferol 50 MCG ( UT) TABS, Take by mouth, Disp: , Rfl:     Current Problem List:   Patient Active Problem List   Diagnosis    Numbness and tingling of both feet    Joint pain in fingers of both hands    Class 2 severe obesity due to excess calories with serious comorbidity and body mass index (BMI) of 35.0 to 35.9 in adult (HCC)    GERD (gastroesophageal reflux disease)    Joint pain, knee    Paresthesia of both hands    Lower back pain    Weakness of both legs    Hypertension    PCOS (polycystic ovarian syndrome)    Overactive bladder    Acute cystitis without hematuria       Social History:   Social History     Tobacco Use    Smoking status: Never    Smokeless tobacco: Never   Substance Use Topics    Alcohol use: Never       Family History:   Family History   Problem Relation Age of Onset    Gall Bladder Disease Maternal Aunt         Stage 4 at time of death 10/2016    Diabetes Maternal Aunt     Diabetes Maternal Aunt     Alcohol Abuse Father     Diabetes Paternal Aunt     Diabetes Paternal Grandmother     Hypertension Brother     Ovarian Cancer Maternal Grandmother     Cancer Maternal Grandmother         Cervical cancer    Heart Disease Maternal Grandmother          of heart attack May 30, 1998.  Her father side suffers from CHF; all of her siblings have all  of heart attacks due to complications of CHF    Alcohol Abuse Maternal Grandfather     Alcohol Abuse Paternal Grandfather     Diabetes Paternal Aunt     Hypertension Mother     Asthma Mother     Colon Cancer Mother 58    Cancer Maternal Aunt         Gallbladder cancer    Cancer Mother         Colon. She had early stage but they did a colon resectional surgery in October 2016    Osteoarthritis Mother        Surgical History:  Past Surgical History:   Procedure Laterality Date    COLONOSCOPY  04/2017    hemorrhoids    UPPER GASTROINTESTINAL ENDOSCOPY  04/2017    WISDOM TOOTH EXTRACTION         ROS  Review of Systems   Constitutional:  Negative for chills, fatigue and fever. HENT:  Positive for congestion, ear pain, postnasal drip, sinus pressure, sinus pain and sore throat. Respiratory:  Negative for cough, shortness of breath and wheezing. Cardiovascular:  Negative for chest pain. Gastrointestinal:  Positive for diarrhea and nausea. Negative for abdominal pain and vomiting. Genitourinary:  Negative for dysuria. Musculoskeletal:  Negative for arthralgias, back pain and myalgias. Neurological:  Negative for dizziness, weakness, light-headedness and numbness. Psychiatric/Behavioral:  The patient is not nervous/anxious. Visit Vitals  /80   Pulse 86   Temp 98.1 °F (36.7 °C)   Ht 5' 5\" (1.651 m)   Wt 212 lb 3.2 oz (96.3 kg)   SpO2 99%   BMI 35.31 kg/m²       Physical Exam  Physical Exam  Constitutional:       General: She is not in acute distress. Appearance: Normal appearance. She is not ill-appearing. HENT:      Ears:      Comments: Bilateral cerumen; not impacted     Nose: Congestion present. Mouth/Throat:      Pharynx: No oropharyngeal exudate or posterior oropharyngeal erythema. Cardiovascular:      Rate and Rhythm: Normal rate and regular rhythm. Heart sounds: No murmur heard. No gallop. Pulmonary:      Effort: Pulmonary effort is normal.      Breath sounds: Normal breath sounds.  No wheezing or rhonchi. Abdominal:      General: There is no distension. Tenderness: There is no abdominal tenderness. Musculoskeletal:         General: No swelling. Neurological:      General: No focal deficit present. Mental Status: She is alert and oriented to person, place, and time. Psychiatric:         Mood and Affect: Mood normal.         Behavior: Behavior normal.          ASSESSMENT & PLAN      I have reviewed the patient's past medical history, social history and family history and vitals. We have discussed treatment plan and follow up and given patient instructions. Patient's questions are answered and we will follow up as indicated. Dominick Lynch was seen today for pharyngitis. Diagnoses and all orders for this visit:    Acute pansinusitis, recurrence not specified  -     amoxicillin-clavulanate (AUGMENTIN) 875-125 MG per tablet; Take 1 tablet by mouth 2 times daily for 5 days    Sore throat  -     AMB POC RAPID STREP TEST    Cough, unspecified type  -     benzonatate (TESSALON) 100 MG capsule; Take 2 capsules by mouth 3 times daily as needed for Cough    Candidiasis of vagina  -     fluconazole (DIFLUCAN) 150 MG tablet; Take 1 tablet by mouth once for 1 dose       -Recommended treatments include: OTC fever/pain reducers such as Tylenol or ibuprofen, antihistamines, nasal steroids,rest, adequate fluid intake. Please let office know if symptoms do not improve over the next 1 to 2 weeks or if symptoms worsen.          Mino Mata, APRN - NP

## 2023-02-28 ENCOUNTER — OFFICE VISIT (OUTPATIENT)
Dept: FAMILY MEDICINE CLINIC | Facility: CLINIC | Age: 33
End: 2023-02-28
Payer: COMMERCIAL

## 2023-02-28 ENCOUNTER — TELEPHONE (OUTPATIENT)
Dept: OBGYN CLINIC | Age: 33
End: 2023-02-28

## 2023-02-28 VITALS
WEIGHT: 209 LBS | OXYGEN SATURATION: 98 % | SYSTOLIC BLOOD PRESSURE: 146 MMHG | HEIGHT: 65 IN | BODY MASS INDEX: 34.82 KG/M2 | TEMPERATURE: 97.9 F | HEART RATE: 93 BPM | DIASTOLIC BLOOD PRESSURE: 84 MMHG

## 2023-02-28 DIAGNOSIS — B37.9 YEAST INFECTION: ICD-10-CM

## 2023-02-28 DIAGNOSIS — N32.81 OVERACTIVE BLADDER: ICD-10-CM

## 2023-02-28 DIAGNOSIS — R09.81 NASAL CONGESTION: ICD-10-CM

## 2023-02-28 DIAGNOSIS — I10 PRIMARY HYPERTENSION: Primary | ICD-10-CM

## 2023-02-28 DIAGNOSIS — K21.9 GASTROESOPHAGEAL REFLUX DISEASE WITHOUT ESOPHAGITIS: ICD-10-CM

## 2023-02-28 DIAGNOSIS — E28.2 PCOS (POLYCYSTIC OVARIAN SYNDROME): ICD-10-CM

## 2023-02-28 PROCEDURE — 99214 OFFICE O/P EST MOD 30 MIN: CPT | Performed by: STUDENT IN AN ORGANIZED HEALTH CARE EDUCATION/TRAINING PROGRAM

## 2023-02-28 PROCEDURE — 3079F DIAST BP 80-89 MM HG: CPT | Performed by: STUDENT IN AN ORGANIZED HEALTH CARE EDUCATION/TRAINING PROGRAM

## 2023-02-28 PROCEDURE — 3077F SYST BP >= 140 MM HG: CPT | Performed by: STUDENT IN AN ORGANIZED HEALTH CARE EDUCATION/TRAINING PROGRAM

## 2023-02-28 RX ORDER — FLUCONAZOLE 150 MG/1
TABLET ORAL
COMMUNITY
Start: 2023-02-24 | End: 2023-02-28 | Stop reason: SDUPTHER

## 2023-02-28 RX ORDER — FLUTICASONE PROPIONATE 50 MCG
1 SPRAY, SUSPENSION (ML) NASAL DAILY
Qty: 32 G | Refills: 1 | Status: SHIPPED | OUTPATIENT
Start: 2023-02-28

## 2023-02-28 RX ORDER — FLUCONAZOLE 150 MG/1
150 TABLET ORAL EVERY OTHER DAY
Qty: 2 TABLET | Refills: 0 | Status: SHIPPED | OUTPATIENT
Start: 2023-02-28

## 2023-02-28 ASSESSMENT — PATIENT HEALTH QUESTIONNAIRE - PHQ9
SUM OF ALL RESPONSES TO PHQ9 QUESTIONS 1 & 2: 0
2. FEELING DOWN, DEPRESSED OR HOPELESS: 0
SUM OF ALL RESPONSES TO PHQ QUESTIONS 1-9: 0
1. LITTLE INTEREST OR PLEASURE IN DOING THINGS: 0

## 2023-02-28 ASSESSMENT — ANXIETY QUESTIONNAIRES
1. FEELING NERVOUS, ANXIOUS, OR ON EDGE: 0
6. BECOMING EASILY ANNOYED OR IRRITABLE: 0
2. NOT BEING ABLE TO STOP OR CONTROL WORRYING: 0
5. BEING SO RESTLESS THAT IT IS HARD TO SIT STILL: 0
3. WORRYING TOO MUCH ABOUT DIFFERENT THINGS: 0
4. TROUBLE RELAXING: 0

## 2023-02-28 NOTE — PROGRESS NOTES
[facility]  Emily Thomas  Phone [phone]  Fax:  [phone]    Patient: Juventino Irby  YOB: 1990  Patient Age 27 y.o. Patient sex: female  Medical Record:  [id number]  Visit Date: 02/28/23    [facility] Clinic Note     Chief Complaint   Patient presents with    Follow-up       History of Present Illness:  70-year-old -American female history of hypertension, PCOS, hyperlipidemia presents for routine follow-up. Follows with OB/GYN for routine screening. Treated for sinus infection last week with antibiotics. Congestion. Prescription sent for Flonase nasal spray. Developed yeast infection from antibiotics. Will prescribe fluconazole today. No other questions or concerns at this time. Jeanmarie any chest pain, shortness of breath, abdominal pain, fever, chills, or any other symptoms at this time. Patient like to follow-up in 3 months with labs at that time. Allergies:   Allergies   Allergen Reactions    Pollen Extract Cough and Itching       Current Medications:   Medications marked \"taking\" at this time:    Current Outpatient Medications:     fluconazole (DIFLUCAN) 150 MG tablet, Take 1 tablet by mouth every other day, Disp: 2 tablet, Rfl: 0    fluticasone (FLONASE) 50 MCG/ACT nasal spray, 1 spray by Each Nostril route daily, Disp: 32 g, Rfl: 1    lidocaine viscous hcl-diphenhydrAMINE-nystatin, SWISH AND SPIT 5ML BY MOUTH FOUR TIMES A DAY AS NEEDED FOR IRRITATION, Disp: , Rfl:     amoxicillin-clavulanate (AUGMENTIN) 875-125 MG per tablet, Take 1 tablet by mouth 2 times daily for 5 days, Disp: 10 tablet, Rfl: 0    benzonatate (TESSALON) 100 MG capsule, Take 2 capsules by mouth 3 times daily as needed for Cough, Disp: 21 capsule, Rfl: 0    Magic Mouthwash (MIRACLE MOUTHWASH), Swish and spit 5 mLs 4 times daily as needed for Irritation, Disp: 240 mL, Rfl: 0    amLODIPine (NORVASC) 2.5 MG tablet, Take 1 tablet by mouth daily, Disp: 90 tablet, Rfl: 3    omeprazole (PRILOSEC) 20 MG delayed release capsule, Take 1 capsule by mouth in the morning and at bedtime, Disp: 180 capsule, Rfl: 3    medroxyPROGESTERone (DEPO-PROVERA) 150 MG/ML injection, Inject 150 mg into the muscle every 3 months, Disp: 1 mL, Rfl: 3    Prenatal MV-Min-Fe Fum-FA-DHA (PRENATAL 1 PO), Take by mouth, Disp: , Rfl:     metFORMIN (GLUCOPHAGE) 500 MG tablet, Take 1 tablet by mouth 2 times daily (with meals), Disp: 180 tablet, Rfl: 1    famotidine (PEPCID) 40 MG tablet, Take 1 tablet by mouth every evening Before supper, Disp: 90 tablet, Rfl: 3    calcium carbonate 1500 (600 Ca) MG TABS tablet, Take 600 mg by mouth 2 times daily, Disp: , Rfl:     Cholecalciferol 50 MCG ( UT) TABS, Take by mouth, Disp: , Rfl:     Current Problem List:   Patient Active Problem List   Diagnosis    Numbness and tingling of both feet    Joint pain in fingers of both hands    Class 2 severe obesity due to excess calories with serious comorbidity and body mass index (BMI) of 35.0 to 35.9 in adult (HCC)    GERD (gastroesophageal reflux disease)    Joint pain, knee    Paresthesia of both hands    Lower back pain    Weakness of both legs    Hypertension    PCOS (polycystic ovarian syndrome)    Overactive bladder    Acute cystitis without hematuria       Social History:   Social History     Tobacco Use    Smoking status: Never    Smokeless tobacco: Never   Substance Use Topics    Alcohol use: Never       Family History:   Family History   Problem Relation Age of Onset    Gall Bladder Disease Maternal Aunt         Stage 4 at time of death 10/2016    Diabetes Maternal Aunt     Diabetes Maternal Aunt     Alcohol Abuse Father     Diabetes Paternal Aunt     Diabetes Paternal Grandmother     Hypertension Brother     Ovarian Cancer Maternal Grandmother     Cancer Maternal Grandmother         Cervical cancer    Heart Disease Maternal Grandmother          of heart attack May 30, 1998.  Her father side suffers from CHF; all of her siblings have all  of heart attacks due to complications of CHF    Alcohol Abuse Maternal Grandfather     Alcohol Abuse Paternal Grandfather     Diabetes Paternal Aunt     Hypertension Mother     Asthma Mother     Colon Cancer Mother 58    Cancer Maternal Aunt         Gallbladder cancer    Cancer Mother         Colon. She had early stage but they did a colon resectional surgery in 2016    Osteoarthritis Mother        Surgical History:  Past Surgical History:   Procedure Laterality Date    COLONOSCOPY  2017    hemorrhoids    UPPER GASTROINTESTINAL ENDOSCOPY  2017    WISDOM TOOTH EXTRACTION         ROS  Review of Systems   All other systems reviewed and are negative. Visit Vitals  BP (!) 146/84   Pulse 93   Temp 97.9 °F (36.6 °C)   Ht 5' 5\" (1.651 m)   Wt 209 lb (94.8 kg)   SpO2 98%   BMI 34.78 kg/m²       Physical Exam  Physical Exam  Constitutional:       General: She is not in acute distress. Appearance: She is obese. Cardiovascular:      Rate and Rhythm: Normal rate and regular rhythm. Heart sounds: No murmur heard. Pulmonary:      Breath sounds: Normal breath sounds. No wheezing. Abdominal:      Palpations: Abdomen is soft. Tenderness: There is no abdominal tenderness. Musculoskeletal:         General: No signs of injury. Skin:     General: Skin is dry. Neurological:      Mental Status: She is alert. Mental status is at baseline. ASSESSMENT & PLAN  Keo Estes was seen today for new patient. Diagnoses and all orders for this visit:    Primary hypertension  - Elevated. /84 today. BMP next visit. Increase amlodipine at next visit if elevated. Likely elevated because she is sick. - Continue amlodipine 2.5 mg daily    Gastroesophageal reflux disease without esophagitis  - Controlled. Wean as tolerated.   - Continue famotidine 40 Mg nightly    Class 2 severe obesity due to excess calories with serious comorbidity and body mass index (BMI) of 35.0 to 35.9 in adult [facility])    PCOS (polycystic ovarian syndrome)  - Follows with OB/GYN  - Continue metformin 500 mg twice daily    Overactive bladder  - Was previously on tolterodine however not covered by insurance. Oxybutynin not covered by insurance. Is doing well without medication. Continue to monitor. Continue 40    Polyneuropathy  -Improved. Consider amitriptyline for nerve pain. Hyperlipidemia, unspecified hyperlipidemia type    Lab Results   Component Value Date    CHOL 179 12/19/2022    CHOL 175 10/07/2022    CHOL 188 07/23/2021     Lab Results   Component Value Date    TRIG 168 (H) 12/19/2022    TRIG 93 10/07/2022    TRIG 169 (H) 07/23/2021     Lab Results   Component Value Date    HDL 37 (L) 12/19/2022    HDL 37 (L) 10/07/2022    HDL 34 (L) 07/23/2021     Lab Results   Component Value Date    LDLCALC 108.4 (H) 12/19/2022    LDLCALC 119.4 (H) 10/07/2022    LDLCALC 124 (H) 07/23/2021         I have reviewed the patient's past medical history, social history and family history and vitals. We have discussed treatment plan and follow up and given patient instructions. Patient's questions are answered and we will follow up as indicated.         Gaby Swift DO

## 2023-02-28 NOTE — TELEPHONE ENCOUNTER
Patient is here for her Medroxyprogesterone  150 mg/ml  Her last injection was 12/08/2022  [facility] # [id number]  Lot # [id number]  Site [facility]  Exp date 04/30/2026  She is to return 05/15 - 05/30 for next injection

## 2023-03-14 ENCOUNTER — TELEPHONE (OUTPATIENT)
Dept: FAMILY MEDICINE CLINIC | Facility: CLINIC | Age: 33
End: 2023-03-14

## 2023-03-14 DIAGNOSIS — G89.29 OTHER CHRONIC PAIN: Primary | ICD-10-CM

## 2023-03-14 NOTE — TELEPHONE ENCOUNTER
----- Message from Henriette Lefort sent at 3/14/2023  9:19 AM EDT -----  Subject: Message to Provider    QUESTIONS  Information for Provider? PT wants a Pain Management referral close to   her, possibly in [facility]. The referral she had is in [place]   that's an hour away. Please contact PT at [phone]  ---------------------------------------------------------------------------  --------------  Silvana Villarreal INFO  [phone]; OK to leave message on voicemail  ---------------------------------------------------------------------------  --------------  SCRIPT ANSWERS  Relationship to Patient?  Self

## 2023-04-28 NOTE — TELEPHONE ENCOUNTER
----- Message from Nelson Bennett sent at 4/27/2023  4:50 PM EDT -----  Subject: Refill Request    QUESTIONS  Name of Medication? metFORMIN (GLUCOPHAGE) 500 MG tablet  Patient-reported dosage and instructions? 500 MG one tablet twice daily  How many days do you have left? 0  Preferred Pharmacy? [id number] phone number (if available)? [phone]  ---------------------------------------------------------------------------  --------------  Uma FREED  What is the best way for the office to contact you? OK to leave message on   voicemail  Preferred Call Back Phone Number? [phone]  ---------------------------------------------------------------------------  --------------  SCRIPT ANSWERS  Relationship to Patient?  Self

## 2023-05-15 NOTE — PROGRESS NOTES
The patient is a 27 y.o. No obstetric history on file. who is seen for spotting when pt wipes. Pt c/o irritation as well. This has been going on for a couple of weeks. + nipple sensitivity, no skin changes or masses. + mild urinary urgency, no pain. No dysuria, + frequency. +spotting when wiping occasionally. She had some vaginal irritation a week or so ago. No odor or irritation. This has resolved overall but she would like nuswab to r/o infxn. She was +gonorrhea earlier this year, worried could be related. She was tx'd but she did not FU for PRIYANKA. Partner was also tx'd. She is due for her next depo provera net week, she has appt for this. HISTORY:    No obstetric history on file. Patient's last menstrual period was 01/31/2023 (exact date). Sexual History:  not sexually active  Contraception:  Depo-Provera injections  Current Outpatient Medications on File Prior to Visit   Medication Sig Dispense Refill    pantoprazole (PROTONIX) 40 MG tablet       metFORMIN (GLUCOPHAGE) 500 MG tablet Take 1 tablet by mouth 2 times daily (with meals) 180 tablet 3    fluticasone (FLONASE) 50 MCG/ACT nasal spray 1 spray by Each Nostril route daily 32 g 1    amLODIPine (NORVASC) 2.5 MG tablet Take 1 tablet by mouth daily 90 tablet 3    medroxyPROGESTERone (DEPO-PROVERA) 150 MG/ML injection Inject 150 mg into the muscle every 3 months 1 mL 3    Prenatal MV-Min-Fe Fum-FA-DHA (PRENATAL 1 PO) Take by mouth      famotidine (PEPCID) 40 MG tablet Take 1 tablet by mouth every evening Before supper 90 tablet 3    calcium carbonate 1500 (600 Ca) MG TABS tablet Take 1 tablet by mouth 2 times daily      Cholecalciferol 50 MCG (2000 UT) TABS Take by mouth       No current facility-administered medications on file prior to visit. ROS:  Feeling well. No dyspnea or chest pain on exertion. No abdominal pain, change in bowel habits, black or bloody stools. No urinary tract symptoms.  GYN ROS: she complains of scant

## 2023-05-16 ENCOUNTER — OFFICE VISIT (OUTPATIENT)
Dept: OBGYN CLINIC | Age: 33
End: 2023-05-16
Payer: COMMERCIAL

## 2023-05-16 VITALS
DIASTOLIC BLOOD PRESSURE: 84 MMHG | HEIGHT: 65 IN | SYSTOLIC BLOOD PRESSURE: 126 MMHG | BODY MASS INDEX: 36.75 KG/M2 | WEIGHT: 220.6 LBS

## 2023-05-16 DIAGNOSIS — N89.8 VAGINAL IRRITATION: ICD-10-CM

## 2023-05-16 DIAGNOSIS — N89.8 VAGINAL ODOR: ICD-10-CM

## 2023-05-16 DIAGNOSIS — N89.8 VAGINAL DISCHARGE: ICD-10-CM

## 2023-05-16 DIAGNOSIS — R35.0 URINARY FREQUENCY: ICD-10-CM

## 2023-05-16 DIAGNOSIS — Z13.89 SCREENING FOR GENITOURINARY CONDITION: Primary | ICD-10-CM

## 2023-05-16 DIAGNOSIS — R30.0 DYSURIA: ICD-10-CM

## 2023-05-16 LAB
BILIRUBIN, URINE, POC: NEGATIVE
BLOOD URINE, POC: NORMAL
GLUCOSE URINE, POC: NEGATIVE
KETONES, URINE, POC: NEGATIVE
LEUKOCYTE ESTERASE, URINE, POC: NORMAL
NITRITE, URINE, POC: NEGATIVE
PH, URINE, POC: 5.5 (ref 4.6–8)
PROTEIN,URINE, POC: NEGATIVE
SPECIFIC GRAVITY, URINE, POC: 1.02 (ref 1–1.03)
URINALYSIS CLARITY, POC: CLEAR
URINALYSIS COLOR, POC: YELLOW
UROBILINOGEN, POC: NORMAL

## 2023-05-16 PROCEDURE — 3074F SYST BP LT 130 MM HG: CPT | Performed by: NURSE PRACTITIONER

## 2023-05-16 PROCEDURE — 3079F DIAST BP 80-89 MM HG: CPT | Performed by: NURSE PRACTITIONER

## 2023-05-16 PROCEDURE — 81003 URINALYSIS AUTO W/O SCOPE: CPT | Performed by: NURSE PRACTITIONER

## 2023-05-16 PROCEDURE — 99214 OFFICE O/P EST MOD 30 MIN: CPT | Performed by: NURSE PRACTITIONER

## 2023-05-16 RX ORDER — PANTOPRAZOLE SODIUM 40 MG/1
TABLET, DELAYED RELEASE ORAL
COMMUNITY
Start: 2023-04-27

## 2023-05-19 LAB
BACTERIA SPEC CULT: NORMAL
SERVICE CMNT-IMP: NORMAL

## 2023-05-20 LAB
A VAGINAE DNA VAG QL NAA+PROBE: ABNORMAL SCORE
BVAB2 DNA VAG QL NAA+PROBE: ABNORMAL SCORE
C ALBICANS DNA VAG QL NAA+PROBE: NEGATIVE
C GLABRATA DNA VAG QL NAA+PROBE: NEGATIVE
MEGA1 DNA VAG QL NAA+PROBE: ABNORMAL SCORE
SPECIMEN SOURCE: ABNORMAL

## 2023-05-22 LAB
A VAGINAE DNA VAG QL NAA+PROBE: ABNORMAL SCORE
BVAB2 DNA VAG QL NAA+PROBE: ABNORMAL SCORE
C ALBICANS DNA VAG QL NAA+PROBE: NEGATIVE
C GLABRATA DNA VAG QL NAA+PROBE: NEGATIVE
C TRACH RRNA SPEC QL NAA+PROBE: POSITIVE
MEGA1 DNA VAG QL NAA+PROBE: ABNORMAL SCORE
N GONORRHOEA RRNA SPEC QL NAA+PROBE: NEGATIVE
SPECIMEN SOURCE: ABNORMAL
T VAGINALIS RRNA SPEC QL NAA+PROBE: NEGATIVE

## 2023-05-24 ENCOUNTER — TELEPHONE (OUTPATIENT)
Dept: OBGYN CLINIC | Age: 33
End: 2023-05-24

## 2023-05-24 DIAGNOSIS — B96.89 BACTERIAL VAGINOSIS: Primary | ICD-10-CM

## 2023-05-24 DIAGNOSIS — N76.0 BACTERIAL VAGINOSIS: Primary | ICD-10-CM

## 2023-05-24 DIAGNOSIS — A74.9 CHLAMYDIA INFECTION: ICD-10-CM

## 2023-05-24 RX ORDER — METRONIDAZOLE 500 MG/1
TABLET ORAL
Qty: 14 TABLET | Refills: 0 | Status: SHIPPED | OUTPATIENT
Start: 2023-05-24

## 2023-05-24 RX ORDER — AZITHROMYCIN 500 MG/1
TABLET, FILM COATED ORAL
Qty: 2 TABLET | Refills: 0 | Status: SHIPPED | OUTPATIENT
Start: 2023-05-24

## 2023-05-24 NOTE — TELEPHONE ENCOUNTER
Patient is here for her Medroxyprogesterone  150 mg/ml  Her last injection was 02/28/2022  [facility] # [id number]  Lot # [id number]  Site [facility]  Exp date 09/30/2026  She is to return 08/09 - 08/23 for next injection

## 2023-05-25 ENCOUNTER — OFFICE VISIT (OUTPATIENT)
Dept: FAMILY MEDICINE CLINIC | Facility: CLINIC | Age: 33
End: 2023-05-25
Payer: COMMERCIAL

## 2023-05-25 VITALS
OXYGEN SATURATION: 98 % | WEIGHT: 221.38 LBS | DIASTOLIC BLOOD PRESSURE: 92 MMHG | TEMPERATURE: 98.6 F | HEIGHT: 65 IN | SYSTOLIC BLOOD PRESSURE: 114 MMHG | HEART RATE: 92 BPM | BODY MASS INDEX: 36.89 KG/M2

## 2023-05-25 DIAGNOSIS — G89.29 OTHER CHRONIC PAIN: ICD-10-CM

## 2023-05-25 DIAGNOSIS — E66.01 CLASS 2 SEVERE OBESITY DUE TO EXCESS CALORIES WITH SERIOUS COMORBIDITY AND BODY MASS INDEX (BMI) OF 35.0 TO 35.9 IN ADULT (HCC): ICD-10-CM

## 2023-05-25 DIAGNOSIS — E78.5 HYPERLIPIDEMIA, UNSPECIFIED HYPERLIPIDEMIA TYPE: ICD-10-CM

## 2023-05-25 DIAGNOSIS — N32.81 OVERACTIVE BLADDER: ICD-10-CM

## 2023-05-25 DIAGNOSIS — E28.2 PCOS (POLYCYSTIC OVARIAN SYNDROME): ICD-10-CM

## 2023-05-25 DIAGNOSIS — I10 PRIMARY HYPERTENSION: Primary | ICD-10-CM

## 2023-05-25 DIAGNOSIS — K21.9 GASTROESOPHAGEAL REFLUX DISEASE WITHOUT ESOPHAGITIS: ICD-10-CM

## 2023-05-25 PROCEDURE — 99213 OFFICE O/P EST LOW 20 MIN: CPT | Performed by: NURSE PRACTITIONER

## 2023-05-25 PROCEDURE — 3074F SYST BP LT 130 MM HG: CPT | Performed by: NURSE PRACTITIONER

## 2023-05-25 PROCEDURE — 3080F DIAST BP >= 90 MM HG: CPT | Performed by: NURSE PRACTITIONER

## 2023-05-25 ASSESSMENT — ENCOUNTER SYMPTOMS
SHORTNESS OF BREATH: 0
DIARRHEA: 0
BACK PAIN: 1
CONSTIPATION: 0
VOMITING: 0
NAUSEA: 0
COUGH: 0
ABDOMINAL PAIN: 0

## 2023-05-25 ASSESSMENT — PATIENT HEALTH QUESTIONNAIRE - PHQ9
SUM OF ALL RESPONSES TO PHQ QUESTIONS 1-9: 0
SUM OF ALL RESPONSES TO PHQ QUESTIONS 1-9: 0
SUM OF ALL RESPONSES TO PHQ9 QUESTIONS 1 & 2: 0
SUM OF ALL RESPONSES TO PHQ QUESTIONS 1-9: 0
2. FEELING DOWN, DEPRESSED OR HOPELESS: 0
1. LITTLE INTEREST OR PLEASURE IN DOING THINGS: 0
SUM OF ALL RESPONSES TO PHQ QUESTIONS 1-9: 0

## 2023-05-25 NOTE — PROGRESS NOTES
[facility]  Emily Thomas  Phone [phone]  Fax:  [phone]    Patient: Mannie Godinez  YOB: 1990  Patient Age [address] y.o. Patient sex: female  Medical Record:  [id number]  Visit Date: 05/25/23    [facility] Clinic Note     Chief Complaint   Patient presents with    Follow-up     3 month, joint pain       History of Present Illness:  26-year-old -American female who presents for 3-month follow-up. Past medical history of hypertension, PCOS, hyperlipidemia, GERD, and chronic pain with back, bilateral hands, knees, and feet. HTN-currently taking amlodipine 2.5 mg daily, currently controlled. Blood pressure in office today is 114/92. She is tolerating current treatment, denies swelling, and is compliant. She denies any other associated symptoms including chest pain, palpitation, syncope, dizziness. PCOS-managed by OB/GYN and is currently taking metformin. Patient states PAP up to date. HLD- not on medication, lab from 12/19 reviewed with patient. GERD-EGD and colonoscopy on 4/27/23 with Dr. Jayro Joshua at [facility] following results:  Findings: EGD: The esophagus was normal length and contour. The GE junction was markedly irregular. This was photographed. Multiple biopsies were obtained. Slight edema was noted. Stomach was entered insufflated. The fundus body and antrum without gastritis or gastric ulcer. Scope was passed in the duodenum. There is no duodenitis or duodenal ulcer  Colonoscopy: The cecum was identified by the ileocecal valve appendix palpation. The cecum was free of any lesions. The ascending colon, transverse colon, descending colon, sigmoid colon and rectum were all normal without signs of colitis colon polyps or colon cancer  Pepcid was changed to pantoprazole 40 mg daily. Patient is compliant with treatment, denies any further episodes of GERD.     Overactive bladder-states she has been on several medications before

## 2023-06-21 NOTE — PROGRESS NOTES
The patient is a 26 y.o. No obstetric history on file. who is seen for a yeast infection. Denies any abnormal discharge. Having a vaginal odor. +itching and irritation    Pt is also requesting a referral to an allergy specialist.   She would like allergy testing.  has skin and GI sensitivities. Of note, pt tested pos for chlamydia on 5/16/23. Was tx with azithromycin. Pt states unsure if partner was tx'd but has not been back with him. Will retest.    She is having diarrhea with metformin. We disc common nature   She has been off metformin for a couple days at this point. States has diarrhea 3x/day, interfering with her overnight work. HISTORY:    No obstetric history on file. No LMP recorded. Patient has had an injection. Sexual History:  single partner, contraception - Depo-Provera injections  Contraception:  Depo-Provera injections  Current Outpatient Medications on File Prior to Visit   Medication Sig Dispense Refill    pantoprazole (PROTONIX) 40 MG tablet       metFORMIN (GLUCOPHAGE) 500 MG tablet Take 1 tablet by mouth 2 times daily (with meals) 180 tablet 3    fluticasone (FLONASE) 50 MCG/ACT nasal spray 1 spray by Each Nostril route daily 32 g 1    amLODIPine (NORVASC) 2.5 MG tablet Take 1 tablet by mouth daily 90 tablet 3    medroxyPROGESTERone (DEPO-PROVERA) 150 MG/ML injection Inject 150 mg into the muscle every 3 months 1 mL 3    Prenatal MV-Min-Fe Fum-FA-DHA (PRENATAL 1 PO) Take by mouth      calcium carbonate 1500 (600 Ca) MG TABS tablet Take 1 tablet by mouth 2 times daily      Cholecalciferol 50 MCG (2000 UT) TABS Take by mouth       No current facility-administered medications on file prior to visit. ROS:  Feeling well. No dyspnea or chest pain on exertion. No abdominal pain, change in bowel habits, black or bloody stools. No urinary tract symptoms. GYN ROS: she complains of vaginal discharge with itching and irritation, diarrhea with metformin. Epifanio Servin     PHYSICAL EXAM:  Blood

## 2023-06-23 ENCOUNTER — OFFICE VISIT (OUTPATIENT)
Dept: OBGYN CLINIC | Age: 33
End: 2023-06-23
Payer: COMMERCIAL

## 2023-06-23 VITALS
WEIGHT: 222.6 LBS | DIASTOLIC BLOOD PRESSURE: 74 MMHG | BODY MASS INDEX: 37.09 KG/M2 | SYSTOLIC BLOOD PRESSURE: 122 MMHG | HEIGHT: 65 IN

## 2023-06-23 DIAGNOSIS — B37.9 YEAST INFECTION: ICD-10-CM

## 2023-06-23 DIAGNOSIS — Z01.82 ENCOUNTER FOR ALLERGY TESTING: ICD-10-CM

## 2023-06-23 DIAGNOSIS — N89.8 VAGINAL ODOR: ICD-10-CM

## 2023-06-23 DIAGNOSIS — Z20.2 CHLAMYDIA CONTACT, TREATED: ICD-10-CM

## 2023-06-23 DIAGNOSIS — N89.8 VAGINAL IRRITATION: ICD-10-CM

## 2023-06-23 DIAGNOSIS — N89.8 VAGINAL DISCHARGE: Primary | ICD-10-CM

## 2023-06-23 PROCEDURE — 99214 OFFICE O/P EST MOD 30 MIN: CPT | Performed by: NURSE PRACTITIONER

## 2023-06-23 PROCEDURE — 3074F SYST BP LT 130 MM HG: CPT | Performed by: NURSE PRACTITIONER

## 2023-06-23 PROCEDURE — 3078F DIAST BP <80 MM HG: CPT | Performed by: NURSE PRACTITIONER

## 2023-06-23 RX ORDER — FLUCONAZOLE 150 MG/1
TABLET ORAL
Qty: 2 TABLET | Refills: 0 | Status: SHIPPED | OUTPATIENT
Start: 2023-06-23

## 2023-06-25 LAB
C TRACH RRNA SPEC QL NAA+PROBE: NEGATIVE
N GONORRHOEA RRNA SPEC QL NAA+PROBE: NEGATIVE
SPECIMEN SOURCE: NORMAL
T VAGINALIS RRNA SPEC QL NAA+PROBE: NEGATIVE

## 2023-06-27 LAB
A VAGINAE DNA VAG QL NAA+PROBE: NORMAL SCORE
BVAB2 DNA VAG QL NAA+PROBE: NORMAL SCORE
C ALBICANS DNA VAG QL NAA+PROBE: NEGATIVE
C GLABRATA DNA VAG QL NAA+PROBE: NEGATIVE
C TRACH RRNA SPEC QL NAA+PROBE: NEGATIVE
MEGA1 DNA VAG QL NAA+PROBE: NORMAL SCORE
N GONORRHOEA RRNA SPEC QL NAA+PROBE: NEGATIVE
SPECIMEN SOURCE: NORMAL
T VAGINALIS RRNA SPEC QL NAA+PROBE: NEGATIVE

## 2023-08-16 ENCOUNTER — TELEPHONE (OUTPATIENT)
Dept: OBGYN CLINIC | Age: 33
End: 2023-08-16

## 2023-08-16 NOTE — TELEPHONE ENCOUNTER
Patient is here for her Medroxyprogesterone  150 mg/ml  Her last injection was 02/28/2022  [facility] # [id number]  Lot # [id number]  Site [facility]  Exp date 12/31/2026  She is to return 11/01 - 11/15 for next injection

## 2023-08-23 ENCOUNTER — TELEPHONE (OUTPATIENT)
Dept: FAMILY MEDICINE CLINIC | Facility: CLINIC | Age: 33
End: 2023-08-23

## 2023-08-23 NOTE — TELEPHONE ENCOUNTER
Patient started not feeling well on 8/19/23 & tested positive for Covid on 8/22/23. Having mild symptoms except coughing a lot. No chest congestion. Recommend patient use OTC cough med, quarantine x 5 days & to ER if becomes SOB.  Patient understands

## 2023-11-07 ENCOUNTER — TELEPHONE (OUTPATIENT)
Dept: OBGYN CLINIC | Age: 33
End: 2023-11-07

## 2023-11-07 RX ORDER — MEDROXYPROGESTERONE ACETATE 150 MG/ML
150 INJECTION, SUSPENSION INTRAMUSCULAR
Qty: 1 ML | Refills: 0 | Status: SHIPPED | OUTPATIENT
Start: 2023-11-07

## 2023-11-08 ENCOUNTER — TELEPHONE (OUTPATIENT)
Dept: OBGYN CLINIC | Age: 33
End: 2023-11-08

## 2023-11-08 NOTE — TELEPHONE ENCOUNTER
Patient is here for her Medroxyprogesterone  150 mg/ml  [facility] # 76815-874-56  Lot # [id number]  Site [facility]  Exp date 9/30/2027

## 2023-11-13 ASSESSMENT — PATIENT HEALTH QUESTIONNAIRE - PHQ9
SUM OF ALL RESPONSES TO PHQ QUESTIONS 1-9: 2
2. FEELING DOWN, DEPRESSED OR HOPELESS: 1
SUM OF ALL RESPONSES TO PHQ QUESTIONS 1-9: 2
1. LITTLE INTEREST OR PLEASURE IN DOING THINGS: 1
1. LITTLE INTEREST OR PLEASURE IN DOING THINGS: SEVERAL DAYS
SUM OF ALL RESPONSES TO PHQ QUESTIONS 1-9: 2
SUM OF ALL RESPONSES TO PHQ9 QUESTIONS 1 & 2: 2
2. FEELING DOWN, DEPRESSED OR HOPELESS: SEVERAL DAYS
SUM OF ALL RESPONSES TO PHQ QUESTIONS 1-9: 2
SUM OF ALL RESPONSES TO PHQ9 QUESTIONS 1 & 2: 2

## 2023-11-16 NOTE — PROGRESS NOTES
MICRO    3. Screening for cervical cancer    - PAP IG, HPV Rfx HPV 16/18,45; Future    4. Screening for human papillomavirus (HPV)    - PAP IG, HPV Rfx HPV 16/18,45; Future     Rtc for DMPA as scheduled  pap smear  return annually or prn    Supervising physician is Dr. Bindu Peraza.     Gary Antunez, APRN - CNP

## 2023-11-17 ENCOUNTER — OFFICE VISIT (OUTPATIENT)
Dept: FAMILY MEDICINE CLINIC | Facility: CLINIC | Age: 33
End: 2023-11-17

## 2023-11-17 ENCOUNTER — OFFICE VISIT (OUTPATIENT)
Dept: OBGYN CLINIC | Age: 33
End: 2023-11-17
Payer: COMMERCIAL

## 2023-11-17 VITALS
WEIGHT: 226.38 LBS | OXYGEN SATURATION: 98 % | HEIGHT: 65 IN | HEART RATE: 85 BPM | BODY MASS INDEX: 37.72 KG/M2 | SYSTOLIC BLOOD PRESSURE: 122 MMHG | TEMPERATURE: 97.9 F | DIASTOLIC BLOOD PRESSURE: 90 MMHG

## 2023-11-17 VITALS
BODY MASS INDEX: 37.77 KG/M2 | SYSTOLIC BLOOD PRESSURE: 130 MMHG | WEIGHT: 226.7 LBS | DIASTOLIC BLOOD PRESSURE: 82 MMHG | HEIGHT: 65 IN

## 2023-11-17 DIAGNOSIS — I10 ESSENTIAL HYPERTENSION: Primary | ICD-10-CM

## 2023-11-17 DIAGNOSIS — Z01.419 WELL WOMAN EXAM: Primary | ICD-10-CM

## 2023-11-17 DIAGNOSIS — Z12.4 SCREENING FOR CERVICAL CANCER: ICD-10-CM

## 2023-11-17 DIAGNOSIS — Z11.51 SCREENING FOR HUMAN PAPILLOMAVIRUS (HPV): ICD-10-CM

## 2023-11-17 DIAGNOSIS — E28.2 PCOS (POLYCYSTIC OVARIAN SYNDROME): ICD-10-CM

## 2023-11-17 DIAGNOSIS — R89.2 ABNORMAL DRUG SCREEN: ICD-10-CM

## 2023-11-17 DIAGNOSIS — Z13.89 SCREENING FOR GENITOURINARY CONDITION: ICD-10-CM

## 2023-11-17 LAB
11-NOR-9-THC-9-COOH, POC: NEGATIVE
AMPHETAMINE, URINE, POC: NEGATIVE
BENZODIAZEPINES, URINE, POC: NEGATIVE
BENZOYLECGONINE, URINE, POC: NEGATIVE
BILIRUBIN, URINE, POC: NEGATIVE
BLOOD URINE, POC: NORMAL
GLUCOSE URINE, POC: NEGATIVE
KETONES, URINE, POC: NEGATIVE
LEUKOCYTE ESTERASE, URINE, POC: NEGATIVE
METHADONE, URINE, POC: NEGATIVE
METHAMPHETAMINE, URINE, POC: NEGATIVE
MORPHINE, URINE, POC: NEGATIVE
NITRITE, URINE, POC: NEGATIVE
PH, URINE, POC: 6 (ref 4.6–8)
PHENCYCLIDINE, URINE, POC: NEGATIVE
PROTEIN,URINE, POC: NEGATIVE
SPECIFIC GRAVITY, URINE, POC: 1.03 (ref 1–1.03)
URINALYSIS CLARITY, POC: CLEAR
URINALYSIS COLOR, POC: YELLOW
URINALYSIS COLOR, POC: YELLOW
UROBILINOGEN, POC: NORMAL

## 2023-11-17 PROCEDURE — 3079F DIAST BP 80-89 MM HG: CPT | Performed by: NURSE PRACTITIONER

## 2023-11-17 PROCEDURE — 81002 URINALYSIS NONAUTO W/O SCOPE: CPT | Performed by: NURSE PRACTITIONER

## 2023-11-17 PROCEDURE — 99395 PREV VISIT EST AGE 18-39: CPT | Performed by: NURSE PRACTITIONER

## 2023-11-17 PROCEDURE — 3075F SYST BP GE 130 - 139MM HG: CPT | Performed by: NURSE PRACTITIONER

## 2023-11-17 RX ORDER — AMLODIPINE BESYLATE 5 MG/1
5 TABLET ORAL DAILY
Qty: 90 TABLET | Refills: 1 | Status: SHIPPED | OUTPATIENT
Start: 2023-11-17

## 2023-11-17 RX ORDER — MEDROXYPROGESTERONE ACETATE 150 MG/ML
150 INJECTION, SUSPENSION INTRAMUSCULAR
Qty: 1 ML | Refills: 3 | Status: SHIPPED | OUTPATIENT
Start: 2023-11-17

## 2023-11-17 RX ORDER — MEDROXYPROGESTERONE ACETATE 150 MG/ML
150 INJECTION, SUSPENSION INTRAMUSCULAR
COMMUNITY
End: 2023-11-17 | Stop reason: SDUPTHER

## 2023-11-17 RX ORDER — FAMOTIDINE 40 MG/1
40 TABLET, FILM COATED ORAL NIGHTLY
COMMUNITY

## 2023-11-17 RX ORDER — CIPROFLOXACIN 500 MG/1
TABLET, FILM COATED ORAL
COMMUNITY
Start: 2023-11-06

## 2023-11-17 RX ORDER — ACETAMINOPHEN 325 MG/1
650 TABLET ORAL EVERY 6 HOURS PRN
COMMUNITY

## 2023-11-17 ASSESSMENT — ENCOUNTER SYMPTOMS
CHEST TIGHTNESS: 0
SHORTNESS OF BREATH: 0

## 2023-11-17 NOTE — PROGRESS NOTES
Chief Complaint   Patient presents with    Follow-up     6 month, feeling fine       HISTORY OF PRESENT ILLNESS:  Michel Jacobs is a very pleasant 26 y.o. female seen for a follow up visit for several chronic medical problems, includin. Hypertension- BP today in the office was 126/98. She denies CP, DYER/SOB, palpitations, lower extremity edema, dizziness, syncopal episodes, tobacco use, regular ETOH use, hx of MI or CVA. She is not exercising and has gained 17# over the last 8 months. She does not monitor BP at home. Takes metformin for PCOS    Lab Results   Component Value Date    WBC 6.9 2022    HGB 13.7 2022    HCT 43.3 2022    MCV 89.8 2022     2022     Lab Results   Component Value Date     2022    K 4.3 2022     2022    CO2 28 2022    BUN 10 2022    CREATININE 0.90 2022    GLUCOSE 97 2022    CALCIUM 9.3 2022    PROT 7.5 2022    LABALBU 3.9 2022    BILITOT 0.3 2022    ALKPHOS 42 (L) 2022    AST 17 2022    ALT 27 2022    LABGLOM >60 2022    GFRAA >60 2022    AGRATIO 1.4 2021    GLOB 3.6 (H) 2022       Lab Results   Component Value Date    TSH 1.110 10/04/2021     Lab Results   Component Value Date    CHOL 179 2022    TRIG 168 (H) 2022    HDL 37 (L) 2022    LDLCALC 108.4 (H) 2022    LABVLDL 33.6 (H) 2022    VLDL 30 2021    CHOLHDLRATIO 4.8 2022     Hemoglobin A1C   Date Value Ref Range Status   10/07/2022 5.6 4.8 - 5.6 % Final     Pt needs UDS performed today. She had a random drug screen at work several days ago and tested positive for amphetamines. She takes no medications that could cause a positive result and adamantly denies use of amphetamines.      PAST MEDICAL HISTORY:  Current Outpatient Medications   Medication Sig Dispense Refill    medroxyPROGESTERone (DEPO-PROVERA) 150 MG/ML injection

## 2023-11-27 ENCOUNTER — TELEPHONE (OUTPATIENT)
Dept: OBGYN CLINIC | Age: 33
End: 2023-11-27

## 2023-11-27 LAB
COLLECTION METHOD: ABNORMAL
CYTOLOGIST CVX/VAG CYTO: ABNORMAL
CYTOLOGY CVX/VAG DOC THIN PREP: ABNORMAL
HPV APTIMA: POSITIVE
HPV GENOTYPE 18,45: NEGATIVE
HPV, GENOTYPE 16: NEGATIVE
Lab: ABNORMAL
PAP SOURCE: ABNORMAL
PATH REPORT.FINAL DX SPEC: ABNORMAL
PATHOLOGIST CVX/VAG CYTO: ABNORMAL
PATHOLOGIST PROVIDED ICD: ABNORMAL
PREV TREATMENT: ABNORMAL
RECOM F/U CVX/VAG CYTO: ABNORMAL
STAT OF ADQ CVX/VAG CYTO-IMP: ABNORMAL

## 2023-11-27 NOTE — TELEPHONE ENCOUNTER
----- Message from ERICKA Mcclellan CNP sent at 11/27/2023  1:56 PM EST -----  ASCUS pap and HPV +  Needs colpo pls schedule

## 2023-11-27 NOTE — TELEPHONE ENCOUNTER
Spoke with patient about pap smear results and need for Colposcopy. Colposcopy scheduled 12/5/23 with Dr. Delaney Brambila. All questions answered. Pt v/u.

## 2023-12-04 NOTE — PROGRESS NOTES
Negative. Needs Colposcopy. Procedure: The patient is counseled regarding the risks of coloposcopy, including bleeding, infection at the biopsy site or endometrium, and failure to identify the lesion. Patient was placed in the lithotomy position. The vulva was examined for suspicious lesions. Subsequently a speculum was inserted and the cervix and vaginal wall were visualized. 3% Acetic acid was applied to the cervix using cotton swabs and the area was further visualized. Abnormal areas were identified  and biopsies were obtained at 6 o'clock position. Silver nitrate was used to control bleeding and good hemostasis was obtained.      Adequate Procedure: Yes    ECC Performed Yes    Additional lesion identified: No     Summary:  PERCY 1    Plan:  Follow up pathology      Kellie Godfrey MD  [facility]

## 2023-12-05 ENCOUNTER — PROCEDURE VISIT (OUTPATIENT)
Dept: OBGYN CLINIC | Age: 33
End: 2023-12-05
Payer: COMMERCIAL

## 2023-12-05 VITALS — BODY MASS INDEX: 37.87 KG/M2 | WEIGHT: 227.3 LBS | HEIGHT: 65 IN

## 2023-12-05 DIAGNOSIS — Z32.02 PREGNANCY EXAMINATION OR TEST, NEGATIVE RESULT: Primary | ICD-10-CM

## 2023-12-05 DIAGNOSIS — Z01.812 PRE-PROCEDURE LAB EXAM: ICD-10-CM

## 2023-12-05 DIAGNOSIS — R87.610 ASCUS WITH POSITIVE HIGH RISK HPV CERVICAL: ICD-10-CM

## 2023-12-05 DIAGNOSIS — R87.810 ASCUS WITH POSITIVE HIGH RISK HPV CERVICAL: ICD-10-CM

## 2023-12-05 LAB
HCG, PREGNANCY, URINE, POC: NEGATIVE
VALID INTERNAL CONTROL, POC: YES

## 2023-12-05 PROCEDURE — 81025 URINE PREGNANCY TEST: CPT | Performed by: STUDENT IN AN ORGANIZED HEALTH CARE EDUCATION/TRAINING PROGRAM

## 2023-12-05 PROCEDURE — 57454 BX/CURETT OF CERVIX W/SCOPE: CPT | Performed by: STUDENT IN AN ORGANIZED HEALTH CARE EDUCATION/TRAINING PROGRAM

## 2023-12-15 ENCOUNTER — NURSE ONLY (OUTPATIENT)
Dept: FAMILY MEDICINE CLINIC | Facility: CLINIC | Age: 33
End: 2023-12-15

## 2023-12-15 VITALS — SYSTOLIC BLOOD PRESSURE: 138 MMHG | DIASTOLIC BLOOD PRESSURE: 84 MMHG

## 2023-12-15 DIAGNOSIS — I10 ESSENTIAL HYPERTENSION: Primary | ICD-10-CM

## 2024-01-05 RX ORDER — CLOTRIMAZOLE AND BETAMETHASONE DIPROPIONATE 10; .64 MG/G; MG/G
CREAM TOPICAL
Qty: 15 G | Refills: 0 | Status: SHIPPED | OUTPATIENT
Start: 2024-01-05

## 2024-01-21 DIAGNOSIS — I10 ESSENTIAL HYPERTENSION: ICD-10-CM

## 2024-01-22 DIAGNOSIS — I10 ESSENTIAL HYPERTENSION: ICD-10-CM

## 2024-01-22 RX ORDER — AMLODIPINE BESYLATE 5 MG/1
5 TABLET ORAL DAILY
Qty: 90 TABLET | Refills: 1 | Status: SHIPPED | OUTPATIENT
Start: 2024-01-22

## 2024-01-22 RX ORDER — AMLODIPINE BESYLATE 5 MG/1
5 TABLET ORAL DAILY
Qty: 90 TABLET | Refills: 1 | OUTPATIENT
Start: 2024-01-22

## 2024-01-31 ENCOUNTER — TELEPHONE (OUTPATIENT)
Dept: OBGYN CLINIC | Age: 34
End: 2024-01-31

## 2024-01-31 NOTE — TELEPHONE ENCOUNTER
Patient is here for her Medroxyprogesterone  150 mg/ml IM injection  [facility] # [address]-9  Lot # [id number]  Exp date 04/2025  Site given LGM  Return 04/18 - 05/02 for next injection.

## 2024-02-09 ENCOUNTER — OFFICE VISIT (OUTPATIENT)
Dept: OBGYN CLINIC | Age: 34
End: 2024-02-09

## 2024-02-09 VITALS
WEIGHT: 226.9 LBS | SYSTOLIC BLOOD PRESSURE: 132 MMHG | BODY MASS INDEX: 37.8 KG/M2 | DIASTOLIC BLOOD PRESSURE: 86 MMHG | HEIGHT: 65 IN

## 2024-02-09 DIAGNOSIS — N89.8 VAGINAL IRRITATION: ICD-10-CM

## 2024-02-09 DIAGNOSIS — Z11.3 ROUTINE SCREENING FOR STI (SEXUALLY TRANSMITTED INFECTION): ICD-10-CM

## 2024-02-09 DIAGNOSIS — N76.6 VULVAR ULCERATION: Primary | ICD-10-CM

## 2024-02-09 DIAGNOSIS — E66.01 SEVERE OBESITY (BMI 35.0-39.9) WITH COMORBIDITY (HCC): ICD-10-CM

## 2024-02-09 DIAGNOSIS — N89.8 VAGINAL ODOR: ICD-10-CM

## 2024-02-09 RX ORDER — VALACYCLOVIR HYDROCHLORIDE 1 G/1
1000 TABLET, FILM COATED ORAL 2 TIMES DAILY
Qty: 14 TABLET | Refills: 0 | Status: SHIPPED | OUTPATIENT
Start: 2024-02-09

## 2024-02-09 NOTE — PROGRESS NOTES
Exam  Genitourinary:                 ASSESSMENT:  Encounter Diagnoses   Name Primary?    Vulvar ulceration Yes    Severe obesity (BMI 35.0-39.9) with comorbidity (HCC)     Vaginal odor     Vaginal irritation     Routine screening for STI (sexually transmitted infection)        PLAN:  All questions answered  Diagnosis explained in detail, including differential  Check nuswab vaginitis +  Will tx based on findings.   Lengthy disc with pt on observed vulvar ulceration. This appears most c/w HSV.   HSV communicability and disease process reviewed with pt.   HSV swab taken  Will send rx valtrex to be taken for current outbreak.   Can consider suppressive tx if desired on + swab.  Check HSV serology. Disc limitations of serology katheryn with primary outbreak.    Exam chaperoned by Jennifer Adair MA    Orders Placed This Encounter   Procedures    Nuswab Vaginitis Plus (VG+)     Standing Status:   Future     Number of Occurrences:   1     Standing Expiration Date:   2/9/2025    HSV by CAN     Standing Status:   Future     Number of Occurrences:   1     Standing Expiration Date:   2/9/2025    HSV 1 and 2 Specific Ab, IgG     Standing Status:   Future     Number of Occurrences:   1     Standing Expiration Date:   2/9/2025         Supervising physician is Dr. Partida.

## 2024-02-11 LAB
HSV1 IGG SER IA-ACNC: 36 INDEX (ref 0–0.9)
HSV2 IGG SER IA-ACNC: 4.87 INDEX (ref 0–0.9)

## 2024-02-12 LAB
A VAGINAE DNA VAG QL NAA+PROBE: NORMAL SCORE
BVAB2 DNA VAG QL NAA+PROBE: NORMAL SCORE
C ALBICANS DNA VAG QL NAA+PROBE: NEGATIVE
C GLABRATA DNA VAG QL NAA+PROBE: NEGATIVE
C TRACH RRNA SPEC QL NAA+PROBE: NEGATIVE
HSV1 DNA SPEC QL NAA+PROBE: NEGATIVE
HSV2 DNA SPEC QL NAA+PROBE: POSITIVE
MEGA1 DNA VAG QL NAA+PROBE: NORMAL SCORE
N GONORRHOEA RRNA SPEC QL NAA+PROBE: NEGATIVE
SPECIMEN SOURCE: ABNORMAL
SPECIMEN SOURCE: NORMAL
T VAGINALIS RRNA SPEC QL NAA+PROBE: NEGATIVE

## 2024-02-13 ENCOUNTER — TELEPHONE (OUTPATIENT)
Dept: OBGYN CLINIC | Age: 34
End: 2024-02-13

## 2024-02-13 RX ORDER — VALACYCLOVIR HYDROCHLORIDE 500 MG/1
TABLET, FILM COATED ORAL
Qty: 30 TABLET | Refills: 1 | Status: SHIPPED | OUTPATIENT
Start: 2024-02-13

## 2024-02-13 NOTE — TELEPHONE ENCOUNTER
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   Once this resolves, continue to monitor for more outbreaks and can take valtrex as needed in the future for any sores that may occur.   Nuswab is pending

## 2024-02-13 NOTE — TELEPHONE ENCOUNTER
Phoned patient to review HSV swab results; no answer. Message left notifying patient a [employer] message was sent. No medical information left on voicemail. Mychart message sent.

## 2024-02-16 ENCOUNTER — OFFICE VISIT (OUTPATIENT)
Dept: FAMILY MEDICINE CLINIC | Facility: CLINIC | Age: 34
End: 2024-02-16
Payer: COMMERCIAL

## 2024-02-16 VITALS
BODY MASS INDEX: 37.57 KG/M2 | DIASTOLIC BLOOD PRESSURE: 80 MMHG | TEMPERATURE: 98.1 F | OXYGEN SATURATION: 97 % | WEIGHT: 225.5 LBS | HEIGHT: 65 IN | SYSTOLIC BLOOD PRESSURE: 131 MMHG

## 2024-02-16 DIAGNOSIS — M79.643 CHRONIC HAND PAIN, UNSPECIFIED LATERALITY: ICD-10-CM

## 2024-02-16 DIAGNOSIS — R20.2 PARESTHESIA OF BOTH HANDS: ICD-10-CM

## 2024-02-16 DIAGNOSIS — G56.02 CARPAL TUNNEL SYNDROME OF LEFT WRIST: ICD-10-CM

## 2024-02-16 DIAGNOSIS — R06.83 SNORING: Primary | ICD-10-CM

## 2024-02-16 DIAGNOSIS — G47.19 EXCESSIVE DAYTIME SLEEPINESS: ICD-10-CM

## 2024-02-16 DIAGNOSIS — R06.81 WITNESSED APNEIC SPELLS: ICD-10-CM

## 2024-02-16 DIAGNOSIS — G89.29 CHRONIC HAND PAIN, UNSPECIFIED LATERALITY: ICD-10-CM

## 2024-02-16 DIAGNOSIS — Z91.89 RISK FACTORS FOR OBSTRUCTIVE SLEEP APNEA: ICD-10-CM

## 2024-02-16 PROCEDURE — 99214 OFFICE O/P EST MOD 30 MIN: CPT | Performed by: PHYSICIAN ASSISTANT

## 2024-02-16 PROCEDURE — 3079F DIAST BP 80-89 MM HG: CPT | Performed by: PHYSICIAN ASSISTANT

## 2024-02-16 PROCEDURE — 3075F SYST BP GE 130 - 139MM HG: CPT | Performed by: PHYSICIAN ASSISTANT

## 2024-02-19 ENCOUNTER — TELEPHONE (OUTPATIENT)
Dept: FAMILY MEDICINE CLINIC | Facility: CLINIC | Age: 34
End: 2024-02-19

## 2024-02-28 ENCOUNTER — OFFICE VISIT (OUTPATIENT)
Dept: ORTHOPEDIC SURGERY | Age: 34
End: 2024-02-28
Payer: COMMERCIAL

## 2024-02-28 DIAGNOSIS — M19.90 INFLAMMATORY ARTHROPATHY: ICD-10-CM

## 2024-02-28 DIAGNOSIS — R20.0 NUMBNESS AND TINGLING OF BOTH UPPER EXTREMITIES: ICD-10-CM

## 2024-02-28 DIAGNOSIS — R20.2 NUMBNESS AND TINGLING OF BOTH UPPER EXTREMITIES: ICD-10-CM

## 2024-02-28 DIAGNOSIS — M65.9 TENOSYNOVITIS: ICD-10-CM

## 2024-02-28 DIAGNOSIS — G56.03 BILATERAL CARPAL TUNNEL SYNDROME: ICD-10-CM

## 2024-02-28 DIAGNOSIS — G56.03 BILATERAL CARPAL TUNNEL SYNDROME: Primary | ICD-10-CM

## 2024-02-28 LAB
ALBUMIN SERPL-MCNC: 4 G/DL (ref 3.5–5)
ALBUMIN/GLOB SERPL: 1.3 (ref 0.4–1.6)
ALP SERPL-CCNC: 46 U/L (ref 50–136)
ALT SERPL-CCNC: 22 U/L (ref 12–65)
ANION GAP SERPL CALC-SCNC: 2 MMOL/L (ref 2–11)
AST SERPL-CCNC: 12 U/L (ref 15–37)
BASOPHILS # BLD: 0 K/UL (ref 0–0.2)
BASOPHILS NFR BLD: 1 % (ref 0–2)
BILIRUB SERPL-MCNC: 0.5 MG/DL (ref 0.2–1.1)
BUN SERPL-MCNC: 6 MG/DL (ref 6–23)
CALCIUM SERPL-MCNC: 9.5 MG/DL (ref 8.3–10.4)
CHLORIDE SERPL-SCNC: 109 MMOL/L (ref 103–113)
CO2 SERPL-SCNC: 30 MMOL/L (ref 21–32)
CREAT SERPL-MCNC: 0.7 MG/DL (ref 0.6–1)
CRP SERPL-MCNC: 0.4 MG/DL (ref 0–0.9)
DIFFERENTIAL METHOD BLD: ABNORMAL
EOSINOPHIL # BLD: 0.2 K/UL (ref 0–0.8)
EOSINOPHIL NFR BLD: 3 % (ref 0.5–7.8)
ERYTHROCYTE [DISTWIDTH] IN BLOOD BY AUTOMATED COUNT: 14 % (ref 11.9–14.6)
ERYTHROCYTE [SEDIMENTATION RATE] IN BLOOD: 5 MM/HR (ref 0–20)
GLOBULIN SER CALC-MCNC: 3.2 G/DL (ref 2.8–4.5)
GLUCOSE SERPL-MCNC: 73 MG/DL (ref 65–100)
HCT VFR BLD AUTO: 44.7 % (ref 35.8–46.3)
HGB BLD-MCNC: 13.8 G/DL (ref 11.7–15.4)
IMM GRANULOCYTES # BLD AUTO: 0 K/UL (ref 0–0.5)
IMM GRANULOCYTES NFR BLD AUTO: 1 % (ref 0–5)
LYMPHOCYTES # BLD: 1.8 K/UL (ref 0.5–4.6)
LYMPHOCYTES NFR BLD: 36 % (ref 13–44)
MCH RBC QN AUTO: 27.9 PG (ref 26.1–32.9)
MCHC RBC AUTO-ENTMCNC: 30.9 G/DL (ref 31.4–35)
MCV RBC AUTO: 90.5 FL (ref 82–102)
MONOCYTES # BLD: 0.4 K/UL (ref 0.1–1.3)
MONOCYTES NFR BLD: 8 % (ref 4–12)
NEUTS SEG # BLD: 2.6 K/UL (ref 1.7–8.2)
NEUTS SEG NFR BLD: 52 % (ref 43–78)
NRBC # BLD: 0 K/UL (ref 0–0.2)
PLATELET # BLD AUTO: 415 K/UL (ref 150–450)
PMV BLD AUTO: 9.6 FL (ref 9.4–12.3)
POTASSIUM SERPL-SCNC: 4.6 MMOL/L (ref 3.5–5.1)
PROT SERPL-MCNC: 7.2 G/DL (ref 6.3–8.2)
RBC # BLD AUTO: 4.94 M/UL (ref 4.05–5.2)
SODIUM SERPL-SCNC: 141 MMOL/L (ref 136–146)
URATE SERPL-MCNC: 4.3 MG/DL (ref 2.6–6)
WBC # BLD AUTO: 5.1 K/UL (ref 4.3–11.1)

## 2024-02-28 PROCEDURE — L3908 WHO COCK-UP NONMOLDE PRE OTS: HCPCS | Performed by: NURSE PRACTITIONER

## 2024-02-28 PROCEDURE — 99204 OFFICE O/P NEW MOD 45 MIN: CPT | Performed by: NURSE PRACTITIONER

## 2024-02-28 NOTE — PROGRESS NOTES
Orthopaedic Hand Surgery Note    Name: Dominga Britt  YOB: 1990  Gender: female  MRN: [id number]    CC: New patient referred for bilateral hand pain, numbness, and tingling, left worse than right      HPI: Patient is a 33 y.o. female with a chief complaint of bilateral hand numbness and tingling in the median and ulnar nerve distribution. The symptoms have been going on for several years. The patient does complain of night wakening and increased symptoms with driving. Evaluation to date has included PCP.  She had nerve conduction studies done by Dr. Wilson in April 2021 that showed mild left carpal tunnel syndrome.  It was recommended for her to follow-up in 18 to 24 months. Treatment to date has included none.  She works at a [employer] center as well as [employer].  She does heavy lifting, pushing, pulling, repetitive work.  She notes her mother and sister both had carpal tunnel syndrome resulting in surgery.  She complains of multiple joint pain.  She complains of morning stiffness.    ROS/Meds/PSH/PMH/FH/SH: I personally reviewed the patients standard intake form.  Pertinents are discussed In the HPI    Physical Examination:    Musculoskeletal:   Cervical spine has decreased range of motion without tenderness to palpation, negative Spurling's test. Shoulders and elbows have normal pain free range of motion.    Examination of the bilateral upper extremity demonstrates Decreased sensation to light touch in the median distribution and ulnar and normal in radial distribution, positive carpal tunnel compression testing and Phalen testing, cap refill < 5 seconds in all fingers. Inspection reveals no thenar atrophy.  Positive Tinel and elbow flexion compression test of the cubital tunnel, negative Tinel over Guyon's canal. Sensation to light touch in the ulnar 2 digits is decreased with no intrinsic atrophy/weakness. No tenderness to palpation or masses noted in the forearm.    Imaging /

## 2024-02-28 NOTE — PROGRESS NOTES
Patient was fitted and instructed on an  Wrist Splint for patients bilateral wrist. Patient is aware the hand slides in the brace with the thumb placed threw the thumb hole. I demonstrated the correct way to tighten the brace straps to allow for a comfortable fit. Patient understood the correct way to wear the brace.    Patient read and signed documenting they understand and agree to [facility]'s current DME return policy.

## 2024-02-29 LAB — RHEUMATOID FACT SER QL LA: NEGATIVE

## 2024-03-01 LAB
ANA SER QL: NEGATIVE
CCP IGA+IGG SERPL IA-ACNC: 5 UNITS (ref 0–19)

## 2024-03-06 LAB — HLA-B27 QL NAA+PROBE: NEGATIVE

## 2024-04-09 ENCOUNTER — PROCEDURE VISIT (OUTPATIENT)
Dept: NEUROLOGY | Age: 34
End: 2024-04-09
Payer: COMMERCIAL

## 2024-04-09 VITALS — WEIGHT: 224 LBS | OXYGEN SATURATION: 98 % | HEART RATE: 89 BPM | BODY MASS INDEX: 37.32 KG/M2 | HEIGHT: 65 IN

## 2024-04-09 DIAGNOSIS — R20.2 PARESTHESIA OF BOTH HANDS: Primary | ICD-10-CM

## 2024-04-09 PROCEDURE — 95885 MUSC TST DONE W/NERV TST LIM: CPT | Performed by: PSYCHIATRY & NEUROLOGY

## 2024-04-09 PROCEDURE — 95913 NRV CNDJ TEST 13/> STUDIES: CPT | Performed by: PSYCHIATRY & NEUROLOGY

## 2024-04-09 NOTE — PROGRESS NOTES
EMG/Nerve Conduction Study Procedure Note  [address]    [address]  [place]  [address]   [phone]      Hx:    Exam:     33 y.o.   BAA female  with paresthesia hands - prev hx CTS - no atrophy pos tinel. No Itz.  No Baugh.  Family history CTS positive.  Referring: Jayla Catherine CNP  Technologist: Keith Beverly  Height: 5 foot 5 inch        Summary      upper extremity CV with EMG limited and muscles as below.                Controlled environmental factors / EMG lab.  Temperature.   NCV : sensory segments:    Slowed bilateral distal SCV worse on the left with attenuated SNAP amplitudes.  Normal bilateral ulnar bilateral radial SCV.  NCV transcarpal sensory segments:     Abnormal slowing of the transcarpal median SCV compared to normal bilateral ulnar SCV and peak difference of latency at 0.87 ms right (UL = 0.20 ms) and left difference is 0.67 ms.  NCV Motor MCV segments:    Normal bilateral median bilateral ulnar MCV.  F-wave studies:      Normal bilateral median and ulnar F waves although the right median is a bit delayed compared to the left median latencies.       NEEDLE EMG:   Tested muscles::    Normal bilateral APB muscles Normal insertional activity and interference pattern/recruitment.  No fasciculations fibrillations positive sharp waves.  Normal MUP.  No BSS AP.  No giant MUP.  No myotonia.  No upper motor neuron sign.          INTERPRETATION:    THESE FINDINGS ARE ELECTROPHYSIOLOGIC EVIDENCE OF EARLY BILATERAL CARPAL TUNNEL MEDIAN NERVE ENTRAPMENTS WITHOUT AXONAL DENERVATION.             CONCLUSION:      Compatible with bilateral early carpal tunnel syndromes.      Procedure Details:       Correlates with early bilateral carpal tunnel syndromes.    Patient made aware              Please Note::     Data and waveforms * filed under Procedure category [facility].    See Procedure Files for complete data pages.       [ *NOTE:  parts or all of this consultation are produced using artificial

## 2024-04-16 RX ORDER — MEDROXYPROGESTERONE ACETATE 150 MG/ML
150 INJECTION, SUSPENSION INTRAMUSCULAR
Qty: 1 ML | Refills: 3 | OUTPATIENT
Start: 2024-04-16

## 2024-04-18 ENCOUNTER — TELEPHONE (OUTPATIENT)
Dept: OBGYN CLINIC | Age: 34
End: 2024-04-18

## 2024-04-18 NOTE — TELEPHONE ENCOUNTER
Patient is here for her Medroxyprogesterone  150 mg/ml IM injection  NDC # [address]-5  Lot # [id number]  Exp date 12/2025  Site given RGM  Return 07/04 - 07/18 for next injection.

## 2024-05-31 ENCOUNTER — NURSE ONLY (OUTPATIENT)
Dept: FAMILY MEDICINE CLINIC | Facility: CLINIC | Age: 34
End: 2024-05-31

## 2024-05-31 DIAGNOSIS — I10 PRIMARY HYPERTENSION: ICD-10-CM

## 2024-05-31 DIAGNOSIS — R73.03 PREDIABETES: ICD-10-CM

## 2024-05-31 DIAGNOSIS — E78.5 HYPERLIPIDEMIA, UNSPECIFIED HYPERLIPIDEMIA TYPE: Primary | ICD-10-CM

## 2024-05-31 DIAGNOSIS — E78.5 HYPERLIPIDEMIA, UNSPECIFIED HYPERLIPIDEMIA TYPE: ICD-10-CM

## 2024-05-31 LAB
ALBUMIN SERPL-MCNC: 3.9 G/DL (ref 3.5–5)
ALBUMIN/GLOB SERPL: 1.3 (ref 1–1.9)
ALP SERPL-CCNC: 44 U/L (ref 35–104)
ALT SERPL-CCNC: 19 U/L (ref 12–65)
ANION GAP SERPL CALC-SCNC: 10 MMOL/L (ref 9–18)
AST SERPL-CCNC: 24 U/L (ref 15–37)
BASOPHILS # BLD: 0 K/UL (ref 0–0.2)
BASOPHILS NFR BLD: 0 % (ref 0–2)
BILIRUB SERPL-MCNC: 0.4 MG/DL (ref 0–1.2)
BUN SERPL-MCNC: 8 MG/DL (ref 6–23)
CALCIUM SERPL-MCNC: 9.2 MG/DL (ref 8.8–10.2)
CHLORIDE SERPL-SCNC: 103 MMOL/L (ref 98–107)
CHOLEST SERPL-MCNC: 184 MG/DL (ref 0–200)
CO2 SERPL-SCNC: 25 MMOL/L (ref 20–28)
CREAT SERPL-MCNC: 0.67 MG/DL (ref 0.6–1.1)
DIFFERENTIAL METHOD BLD: ABNORMAL
EOSINOPHIL # BLD: 0.1 K/UL (ref 0–0.8)
EOSINOPHIL NFR BLD: 2 % (ref 0.5–7.8)
ERYTHROCYTE [DISTWIDTH] IN BLOOD BY AUTOMATED COUNT: 13.8 % (ref 11.9–14.6)
EST. AVERAGE GLUCOSE BLD GHB EST-MCNC: 122 MG/DL
GLOBULIN SER CALC-MCNC: 2.9 G/DL (ref 2.3–3.5)
GLUCOSE SERPL-MCNC: 86 MG/DL (ref 70–99)
HBA1C MFR BLD: 5.9 % (ref 0–5.6)
HCT VFR BLD AUTO: 43.8 % (ref 35.8–46.3)
HDLC SERPL-MCNC: 34 MG/DL (ref 40–60)
HDLC SERPL: 5.4 (ref 0–5)
HGB BLD-MCNC: 13.7 G/DL (ref 11.7–15.4)
IMM GRANULOCYTES # BLD AUTO: 0 K/UL (ref 0–0.5)
IMM GRANULOCYTES NFR BLD AUTO: 1 % (ref 0–5)
LDLC SERPL CALC-MCNC: 136 MG/DL (ref 0–100)
LYMPHOCYTES # BLD: 2.6 K/UL (ref 0.5–4.6)
LYMPHOCYTES NFR BLD: 39 % (ref 13–44)
MCH RBC QN AUTO: 27.8 PG (ref 26.1–32.9)
MCHC RBC AUTO-ENTMCNC: 31.3 G/DL (ref 31.4–35)
MCV RBC AUTO: 89 FL (ref 82–102)
MONOCYTES # BLD: 0.5 K/UL (ref 0.1–1.3)
MONOCYTES NFR BLD: 7 % (ref 4–12)
NEUTS SEG # BLD: 3.4 K/UL (ref 1.7–8.2)
NEUTS SEG NFR BLD: 51 % (ref 43–78)
NRBC # BLD: 0 K/UL (ref 0–0.2)
PLATELET # BLD AUTO: 365 K/UL (ref 150–450)
PMV BLD AUTO: 9.4 FL (ref 9.4–12.3)
POTASSIUM SERPL-SCNC: 4.5 MMOL/L (ref 3.5–5.1)
PROT SERPL-MCNC: 6.8 G/DL (ref 6.3–8.2)
RBC # BLD AUTO: 4.92 M/UL (ref 4.05–5.2)
SODIUM SERPL-SCNC: 138 MMOL/L (ref 136–145)
TRIGL SERPL-MCNC: 68 MG/DL (ref 0–150)
VLDLC SERPL CALC-MCNC: 14 MG/DL (ref 6–23)
WBC # BLD AUTO: 6.7 K/UL (ref 4.3–11.1)

## 2024-06-02 SDOH — ECONOMIC STABILITY: FOOD INSECURITY: WITHIN THE PAST 12 MONTHS, YOU WORRIED THAT YOUR FOOD WOULD RUN OUT BEFORE YOU GOT MONEY TO BUY MORE.: NEVER TRUE

## 2024-06-02 SDOH — ECONOMIC STABILITY: FOOD INSECURITY: WITHIN THE PAST 12 MONTHS, THE FOOD YOU BOUGHT JUST DIDN'T LAST AND YOU DIDN'T HAVE MONEY TO GET MORE.: PATIENT DECLINED

## 2024-06-02 SDOH — ECONOMIC STABILITY: INCOME INSECURITY: HOW HARD IS IT FOR YOU TO PAY FOR THE VERY BASICS LIKE FOOD, HOUSING, MEDICAL CARE, AND HEATING?: NOT HARD AT ALL

## 2024-06-06 ENCOUNTER — OFFICE VISIT (OUTPATIENT)
Dept: FAMILY MEDICINE CLINIC | Facility: CLINIC | Age: 34
End: 2024-06-06
Payer: COMMERCIAL

## 2024-06-06 VITALS
TEMPERATURE: 97.5 F | HEIGHT: 65 IN | OXYGEN SATURATION: 98 % | BODY MASS INDEX: 37.53 KG/M2 | SYSTOLIC BLOOD PRESSURE: 133 MMHG | DIASTOLIC BLOOD PRESSURE: 80 MMHG | WEIGHT: 225.25 LBS | HEART RATE: 81 BPM

## 2024-06-06 DIAGNOSIS — I10 ESSENTIAL HYPERTENSION: Primary | ICD-10-CM

## 2024-06-06 DIAGNOSIS — E78.2 MIXED HYPERLIPIDEMIA: ICD-10-CM

## 2024-06-06 DIAGNOSIS — Z91.89 RISK FACTORS FOR OBSTRUCTIVE SLEEP APNEA: ICD-10-CM

## 2024-06-06 DIAGNOSIS — R06.83 SNORING: ICD-10-CM

## 2024-06-06 DIAGNOSIS — R06.81 WITNESSED APNEIC SPELLS: ICD-10-CM

## 2024-06-06 DIAGNOSIS — K21.9 GASTROESOPHAGEAL REFLUX DISEASE WITHOUT ESOPHAGITIS: ICD-10-CM

## 2024-06-06 DIAGNOSIS — R73.03 PREDIABETES: ICD-10-CM

## 2024-06-06 DIAGNOSIS — E28.2 PCOS (POLYCYSTIC OVARIAN SYNDROME): ICD-10-CM

## 2024-06-06 PROCEDURE — 3075F SYST BP GE 130 - 139MM HG: CPT | Performed by: PHYSICIAN ASSISTANT

## 2024-06-06 PROCEDURE — 3079F DIAST BP 80-89 MM HG: CPT | Performed by: PHYSICIAN ASSISTANT

## 2024-06-06 PROCEDURE — 99214 OFFICE O/P EST MOD 30 MIN: CPT | Performed by: PHYSICIAN ASSISTANT

## 2024-06-06 RX ORDER — PANTOPRAZOLE SODIUM 40 MG/1
40 TABLET, DELAYED RELEASE ORAL DAILY
Qty: 90 TABLET | Refills: 3 | Status: SHIPPED | OUTPATIENT
Start: 2024-06-06

## 2024-06-06 RX ORDER — FAMOTIDINE 40 MG/1
40 TABLET, FILM COATED ORAL NIGHTLY
Qty: 90 TABLET | Refills: 3 | Status: SHIPPED | OUTPATIENT
Start: 2024-06-06

## 2024-06-06 RX ORDER — AMLODIPINE BESYLATE 5 MG/1
5 TABLET ORAL DAILY
Qty: 90 TABLET | Refills: 3 | Status: SHIPPED | OUTPATIENT
Start: 2024-06-06

## 2024-06-06 ASSESSMENT — PATIENT HEALTH QUESTIONNAIRE - PHQ9
SUM OF ALL RESPONSES TO PHQ QUESTIONS 1-9: 0
SUM OF ALL RESPONSES TO PHQ QUESTIONS 1-9: 0
SUM OF ALL RESPONSES TO PHQ9 QUESTIONS 1 & 2: 0
1. LITTLE INTEREST OR PLEASURE IN DOING THINGS: NOT AT ALL
SUM OF ALL RESPONSES TO PHQ QUESTIONS 1-9: 0
SUM OF ALL RESPONSES TO PHQ QUESTIONS 1-9: 0
2. FEELING DOWN, DEPRESSED OR HOPELESS: NOT AT ALL

## 2024-06-06 ASSESSMENT — ENCOUNTER SYMPTOMS
SHORTNESS OF BREATH: 0
CHEST TIGHTNESS: 0

## 2024-06-06 NOTE — PROGRESS NOTES
Chief Complaint   Patient presents with    Follow-up     6 month, discuss test results, feeling fine       HISTORY OF PRESENT ILLNESS:  Dominga Britt is a very pleasant 34 y.o. female seen for a follow up visit for several chronic medical problems, includin. Hypertension- BP today in the office was 133/80. She denies CP, DYER/SOB, palpitations, lower extremity edema, dizziness, syncopal episodes, tobacco use, regular ETOH use, hx of MI or CVA. She does not monitor BP at home. Was referred for sleep study in  but was too expensive.     Lab Results   Component Value Date    WBC 6.7 2024    HGB 13.7 2024    HCT 43.8 2024    MCV 89.0 2024     2024     Lab Results   Component Value Date     2024    K 4.5 2024     2024    CO2 25 2024    BUN 8 2024    CREATININE 0.67 2024    GLUCOSE 86 2024    CALCIUM 9.2 2024    BILITOT 0.4 2024    ALKPHOS 44 2024    AST 24 2024    ALT 19 2024    LABGLOM >90 2024    GFRAA >60 2022    AGRATIO 1.4 2021    GLOB 2.9 2024       Lab Results   Component Value Date    TSH 1.110 10/04/2021     Lab Results   Component Value Date    CHOL 184 2024    TRIG 68 2024    HDL 34 (L) 2024    VLDL 14 2024    CHOLHDLRATIO 5.4 (H) 2024     2. GERD- controlled with protonix and famotidine. She denies abdominal pain, n/v/d, constipation, bloody stool. Had EGD performed in  and demonstrated irregular GE, consistent with GERD. Also had colonoscopy performed, which was normal. Mom had colon cancer in her 60's.     3. Prediabetes- taking metformin, also for PCOS. Does not monitor glucose.     Hemoglobin A1C   Date Value Ref Range Status   2024 5.9 (H) 0 - 5.6 % Final     Comment:     Reference Range  Normal       <5.7%  Prediabetes  5.7-6.4%  Diabetes     >6.4%       Accompanied by mom today    PAST MEDICAL

## 2024-06-07 ASSESSMENT — ENCOUNTER SYMPTOMS: APNEA: 1

## 2024-07-08 ENCOUNTER — TELEPHONE (OUTPATIENT)
Dept: OBGYN CLINIC | Age: 34
End: 2024-07-08

## 2024-07-08 NOTE — TELEPHONE ENCOUNTER
Patient is here for her Medroxyprogesterone  150 mg/ml IM injection  NDC # [address]-8  Lot # [id number]  Exp date 12/2025  Site given LGM  Return 09/23 - 10/07 for next injection.

## 2024-07-30 DIAGNOSIS — Z91.09 ENVIRONMENTAL ALLERGIES: Primary | ICD-10-CM

## 2024-09-24 ENCOUNTER — TELEPHONE (OUTPATIENT)
Dept: OBGYN CLINIC | Age: 34
End: 2024-09-24

## 2024-10-16 ENCOUNTER — OFFICE VISIT (OUTPATIENT)
Dept: OBGYN CLINIC | Age: 34
End: 2024-10-16
Payer: COMMERCIAL

## 2024-10-16 ENCOUNTER — PROCEDURE VISIT (OUTPATIENT)
Dept: OBGYN CLINIC | Age: 34
End: 2024-10-16
Payer: COMMERCIAL

## 2024-10-16 VITALS
BODY MASS INDEX: 38.32 KG/M2 | WEIGHT: 230 LBS | SYSTOLIC BLOOD PRESSURE: 130 MMHG | DIASTOLIC BLOOD PRESSURE: 82 MMHG | HEIGHT: 65 IN

## 2024-10-16 DIAGNOSIS — N83.202 CYSTS OF BOTH OVARIES: Primary | ICD-10-CM

## 2024-10-16 DIAGNOSIS — E28.2 PCOS (POLYCYSTIC OVARIAN SYNDROME): ICD-10-CM

## 2024-10-16 DIAGNOSIS — N83.202 BILATERAL OVARIAN CYSTS: Primary | ICD-10-CM

## 2024-10-16 DIAGNOSIS — N83.201 CYSTS OF BOTH OVARIES: Primary | ICD-10-CM

## 2024-10-16 DIAGNOSIS — N83.201 BILATERAL OVARIAN CYSTS: Primary | ICD-10-CM

## 2024-10-16 DIAGNOSIS — Z87.42 HISTORY OF IRREGULAR MENSTRUAL BLEEDING: ICD-10-CM

## 2024-10-16 PROCEDURE — 76830 TRANSVAGINAL US NON-OB: CPT | Performed by: STUDENT IN AN ORGANIZED HEALTH CARE EDUCATION/TRAINING PROGRAM

## 2024-10-16 PROCEDURE — 99214 OFFICE O/P EST MOD 30 MIN: CPT | Performed by: NURSE PRACTITIONER

## 2024-10-16 PROCEDURE — 3075F SYST BP GE 130 - 139MM HG: CPT | Performed by: NURSE PRACTITIONER

## 2024-10-16 PROCEDURE — 3079F DIAST BP 80-89 MM HG: CPT | Performed by: NURSE PRACTITIONER

## 2024-10-16 RX ORDER — NORETHINDRONE ACETATE AND ETHINYL ESTRADIOL AND FERROUS FUMARATE 1MG-20(24)
1 KIT ORAL DAILY
Qty: 4 PACKET | Refills: 3 | Status: SHIPPED | OUTPATIENT
Start: 2024-10-16

## 2024-11-01 ENCOUNTER — PATIENT MESSAGE (OUTPATIENT)
Dept: INTERNAL MEDICINE CLINIC | Facility: CLINIC | Age: 34
End: 2024-11-01

## 2024-12-09 DIAGNOSIS — E28.2 PCOS (POLYCYSTIC OVARIAN SYNDROME): Primary | ICD-10-CM

## 2024-12-09 RX ORDER — MEDROXYPROGESTERONE ACETATE 150 MG/ML
150 INJECTION, SUSPENSION INTRAMUSCULAR ONCE
Qty: 1 ML | Refills: 0 | Status: SHIPPED | OUTPATIENT
Start: 2024-12-09 | End: 2024-12-09

## 2024-12-09 NOTE — TELEPHONE ENCOUNTER
Scheduled for annual 12/31/24 - one RF sent for injection tomorrow. Refill for year can be sent at annual exam.

## 2024-12-09 NOTE — TELEPHONE ENCOUNTER
Pt called stating that she needs RF of depo injection. Pt is overdue for annual exam (last: 11/17/23). Can you please contact pt to get scheduled for an annual exam and let her know once she is scheduled we can get her medication refilled.     [facility]

## 2024-12-10 ENCOUNTER — TELEPHONE (OUTPATIENT)
Dept: OBGYN CLINIC | Age: 34
End: 2024-12-10

## 2024-12-10 NOTE — TELEPHONE ENCOUNTER
Patient here for Medroxyprogesterone 150 mg/mL IM injection.    NDC:[address]-3  Lot:[id number]  Exp:05/2026  Site: LG  Return 02/25-03/11 for next injection.
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
## 2024-12-31 ENCOUNTER — OFFICE VISIT (OUTPATIENT)
Dept: OBGYN CLINIC | Age: 34
End: 2024-12-31
Payer: COMMERCIAL

## 2024-12-31 VITALS
BODY MASS INDEX: 38.25 KG/M2 | SYSTOLIC BLOOD PRESSURE: 134 MMHG | WEIGHT: 229.6 LBS | HEIGHT: 65 IN | DIASTOLIC BLOOD PRESSURE: 82 MMHG

## 2024-12-31 DIAGNOSIS — R87.610 ATYPICAL SQUAMOUS CELL CHANGES OF UNDETERMINED SIGNIFICANCE (ASCUS) ON CERVICAL CYTOLOGY WITH POSITIVE HIGH RISK HUMAN PAPILLOMA VIRUS (HPV): ICD-10-CM

## 2024-12-31 DIAGNOSIS — N83.202 BILATERAL OVARIAN CYSTS: ICD-10-CM

## 2024-12-31 DIAGNOSIS — R87.810 ATYPICAL SQUAMOUS CELL CHANGES OF UNDETERMINED SIGNIFICANCE (ASCUS) ON CERVICAL CYTOLOGY WITH POSITIVE HIGH RISK HUMAN PAPILLOMA VIRUS (HPV): ICD-10-CM

## 2024-12-31 DIAGNOSIS — Z01.419 WELL WOMAN EXAM: ICD-10-CM

## 2024-12-31 DIAGNOSIS — Z87.42 HISTORY OF IRREGULAR MENSTRUAL BLEEDING: ICD-10-CM

## 2024-12-31 DIAGNOSIS — E28.2 PCOS (POLYCYSTIC OVARIAN SYNDROME): ICD-10-CM

## 2024-12-31 DIAGNOSIS — Z13.89 SCREENING FOR GENITOURINARY CONDITION: ICD-10-CM

## 2024-12-31 DIAGNOSIS — Z01.419 WELL WOMAN EXAM: Primary | ICD-10-CM

## 2024-12-31 DIAGNOSIS — N83.201 BILATERAL OVARIAN CYSTS: ICD-10-CM

## 2024-12-31 LAB
BILIRUBIN, URINE, POC: NEGATIVE
BLOOD URINE, POC: NORMAL
GLUCOSE URINE, POC: NEGATIVE
KETONES, URINE, POC: NEGATIVE
LEUKOCYTE ESTERASE, URINE, POC: NEGATIVE
NITRITE, URINE, POC: NEGATIVE
PH, URINE, POC: 7 (ref 4.6–8)
PROTEIN,URINE, POC: NEGATIVE
SPECIFIC GRAVITY, URINE, POC: 1.02 (ref 1–1.03)
URINALYSIS CLARITY, POC: CLEAR
URINALYSIS COLOR, POC: YELLOW
UROBILINOGEN, POC: NORMAL MG/DL

## 2024-12-31 PROCEDURE — 99395 PREV VISIT EST AGE 18-39: CPT | Performed by: NURSE PRACTITIONER

## 2024-12-31 PROCEDURE — 81002 URINALYSIS NONAUTO W/O SCOPE: CPT | Performed by: NURSE PRACTITIONER

## 2024-12-31 PROCEDURE — 3075F SYST BP GE 130 - 139MM HG: CPT | Performed by: NURSE PRACTITIONER

## 2024-12-31 PROCEDURE — 3079F DIAST BP 80-89 MM HG: CPT | Performed by: NURSE PRACTITIONER

## 2024-12-31 RX ORDER — MEDROXYPROGESTERONE ACETATE 150 MG/ML
150 INJECTION, SUSPENSION INTRAMUSCULAR
Qty: 1 ML | Refills: 3 | Status: SHIPPED | OUTPATIENT
Start: 2024-12-31

## 2024-12-31 RX ORDER — NORETHINDRONE ACETATE AND ETHINYL ESTRADIOL AND FERROUS FUMARATE 1MG-20(24)
1 KIT ORAL DAILY
Qty: 4 PACKET | Refills: 3 | Status: SHIPPED | OUTPATIENT
Start: 2024-12-31

## 2025-01-08 ENCOUNTER — PATIENT MESSAGE (OUTPATIENT)
Dept: OBGYN CLINIC | Age: 35
End: 2025-01-08

## 2025-01-08 LAB
COLLECTION METHOD: ABNORMAL
CYTOLOGIST CVX/VAG CYTO: ABNORMAL
CYTOLOGY CVX/VAG DOC THIN PREP: ABNORMAL
DATE OF LMP: ABNORMAL
HPV APTIMA: POSITIVE
HPV GENOTYPE 18,45: NEGATIVE
HPV, GENOTYPE 16: NEGATIVE
Lab: ABNORMAL
PAP SOURCE: ABNORMAL
PATH REPORT.FINAL DX SPEC: ABNORMAL
PATHOLOGIST CVX/VAG CYTO: ABNORMAL
PATHOLOGIST PROVIDED ICD: ABNORMAL
PREV TREATMENT: ABNORMAL
RECOM F/U CVX/VAG CYTO: ABNORMAL
STAT OF ADQ CVX/VAG CYTO-IMP: ABNORMAL

## 2025-02-26 ENCOUNTER — TELEPHONE (OUTPATIENT)
Dept: OBGYN CLINIC | Age: 35
End: 2025-02-26

## 2025-02-26 NOTE — TELEPHONE ENCOUNTER
Patient here for Medroxyprogesterone 150 mg/mL IM injection.     NDC:[address]-2  Lot:[id number]  Exp:05/2026  Site: 1 ml given in the RGM  Return 05/14 - 05/28 for next injection.

## 2025-04-15 ENCOUNTER — OFFICE VISIT (OUTPATIENT)
Dept: OBGYN CLINIC | Age: 35
End: 2025-04-15
Payer: COMMERCIAL

## 2025-04-15 VITALS
HEIGHT: 65 IN | WEIGHT: 242.3 LBS | SYSTOLIC BLOOD PRESSURE: 114 MMHG | DIASTOLIC BLOOD PRESSURE: 82 MMHG | BODY MASS INDEX: 40.37 KG/M2

## 2025-04-15 DIAGNOSIS — B00.9 HSV (HERPES SIMPLEX VIRUS) INFECTION: Primary | ICD-10-CM

## 2025-04-15 DIAGNOSIS — N89.8 VAGINAL DISCHARGE: ICD-10-CM

## 2025-04-15 DIAGNOSIS — N89.8 VAGINAL IRRITATION: ICD-10-CM

## 2025-04-15 DIAGNOSIS — N76.6 VULVAR ULCERATION: ICD-10-CM

## 2025-04-15 DIAGNOSIS — N89.8 VAGINAL ITCHING: ICD-10-CM

## 2025-04-15 PROCEDURE — 3074F SYST BP LT 130 MM HG: CPT | Performed by: NURSE PRACTITIONER

## 2025-04-15 PROCEDURE — 99214 OFFICE O/P EST MOD 30 MIN: CPT | Performed by: NURSE PRACTITIONER

## 2025-04-15 PROCEDURE — 3079F DIAST BP 80-89 MM HG: CPT | Performed by: NURSE PRACTITIONER

## 2025-04-15 RX ORDER — VALACYCLOVIR HYDROCHLORIDE 500 MG/1
TABLET, FILM COATED ORAL
Qty: 30 TABLET | Refills: 1 | Status: SHIPPED | OUTPATIENT
Start: 2025-04-15

## 2025-04-15 RX ORDER — LISINOPRIL 5 MG/1
5 TABLET ORAL DAILY
COMMUNITY
Start: 2025-04-09 | End: 2026-04-09

## 2025-04-15 NOTE — PROGRESS NOTES
(Patient not taking: Reported on 4/15/2025) 90 tablet 3    pantoprazole (PROTONIX) 40 MG tablet Take 1 tablet by mouth daily (Patient not taking: Reported on 4/15/2025) 90 tablet 3    POTASSIUM PO Take 1 tablet by mouth every morning      ELDERBERRY PO Take 1 tablet by mouth every morning      Prenatal MV-Min-Fe Fum-FA-DHA (PRENATAL 1 PO) Take by mouth (Patient not taking: Reported on 4/15/2025)      calcium carbonate 1500 (600 Ca) MG TABS tablet Take 1 tablet by mouth 2 times daily (Patient not taking: Reported on 4/15/2025)      Cholecalciferol 50 MCG (2000 UT) TABS Take by mouth (Patient not taking: Reported on 4/15/2025)       No current facility-administered medications on file prior to visit.       ROS:  Feeling well. No dyspnea or chest pain on exertion.  No abdominal pain, change in bowel habits, black or bloody stools.  No urinary tract symptoms. GYN ROS: she complains of vaginal discharge and vulvar lesion.    PHYSICAL EXAM:  Blood pressure 114/82, height 1.651 m (5' 5\"), weight 109.9 kg (242 lb 4.8 oz), last menstrual period 02/26/2025.    The patient appears well, alert, oriented x 3, in no distress.  Lungs are clear. Heart RRR, no murmurs. Abdomen soft without tenderness, guarding, mass or organomegaly.  Pelvic: VULVA: Inner aspect of L labia with several small ulcerations in various stages of healing c/w HSV, VAGINA: normal appearing vagina with normal color and discharge, no lesions, CERVIX: normal appearing cervix without discharge or lesions.    Female chaperone present for exam    ASSESSMENT:  Encounter Diagnoses   Name Primary?    Vaginal discharge     Vaginal irritation     Vaginal itching     HSV (herpes simplex virus) infection Yes    Vulvar ulceration        PLAN:  All questions answered  Diagnosis explained in detail, including differential  Disc L labia lesions c/w HSV. Tx with rx valtrex 500mg bid x5-7d  May take prn for lesions moving forward  Check bv and yeast swab will tx based on

## 2025-04-17 LAB
A VAGINAE DNA VAG QL NAA+PROBE: NORMAL SCORE
BVAB2 DNA VAG QL NAA+PROBE: NORMAL SCORE
C ALBICANS DNA VAG QL NAA+PROBE: NEGATIVE
C GLABRATA DNA VAG QL NAA+PROBE: NEGATIVE
MEGA1 DNA VAG QL NAA+PROBE: NORMAL SCORE
SPECIMEN SOURCE: NORMAL

## 2025-04-18 ENCOUNTER — RESULTS FOLLOW-UP (OUTPATIENT)
Dept: OBGYN CLINIC | Age: 35
End: 2025-04-18

## 2025-05-14 ENCOUNTER — TELEPHONE (OUTPATIENT)
Dept: OBGYN CLINIC | Age: 35
End: 2025-05-14

## 2025-05-14 NOTE — TELEPHONE ENCOUNTER
Patient here for Medroxyprogesterone 150 mg/mL IM injection.     NDC:[address]-8  Lot:[id number]  Exp:08/2026  Site: 1 ml given in the LGM  Return 07/30 - 08/14 for next injection.

## 2025-05-16 ENCOUNTER — OFFICE VISIT (OUTPATIENT)
Age: 35
End: 2025-05-16
Payer: COMMERCIAL

## 2025-05-16 DIAGNOSIS — M45.9 ANKYLOSING SPONDYLITIS, UNSPECIFIED SITE OF SPINE (HCC): ICD-10-CM

## 2025-05-16 DIAGNOSIS — G56.03 BILATERAL CARPAL TUNNEL SYNDROME: ICD-10-CM

## 2025-05-16 DIAGNOSIS — M65.90 SYNOVITIS AND TENOSYNOVITIS: Primary | ICD-10-CM

## 2025-05-16 DIAGNOSIS — M65.90 SYNOVITIS AND TENOSYNOVITIS: ICD-10-CM

## 2025-05-16 LAB
ALBUMIN SERPL-MCNC: 3.7 G/DL (ref 3.5–5)
ALBUMIN/GLOB SERPL: 1.1 (ref 1–1.9)
ALP SERPL-CCNC: 57 U/L (ref 35–104)
ALT SERPL-CCNC: 32 U/L (ref 8–45)
ANION GAP SERPL CALC-SCNC: 9 MMOL/L (ref 7–16)
AST SERPL-CCNC: 26 U/L (ref 15–37)
BASOPHILS # BLD: 0.05 K/UL (ref 0–0.2)
BASOPHILS NFR BLD: 0.8 % (ref 0–2)
BILIRUB SERPL-MCNC: 0.4 MG/DL (ref 0–1.2)
BUN SERPL-MCNC: 7 MG/DL (ref 6–23)
CALCIUM SERPL-MCNC: 9.7 MG/DL (ref 8.8–10.2)
CHLORIDE SERPL-SCNC: 104 MMOL/L (ref 98–107)
CO2 SERPL-SCNC: 27 MMOL/L (ref 20–29)
CREAT SERPL-MCNC: 0.74 MG/DL (ref 0.6–1.1)
CRP SERPL-MCNC: 1.4 MG/DL (ref 0–0.4)
DIFFERENTIAL METHOD BLD: ABNORMAL
EOSINOPHIL # BLD: 0.15 K/UL (ref 0–0.8)
EOSINOPHIL NFR BLD: 2.3 % (ref 0.5–7.8)
ERYTHROCYTE [DISTWIDTH] IN BLOOD BY AUTOMATED COUNT: 13.4 % (ref 11.9–14.6)
ERYTHROCYTE [SEDIMENTATION RATE] IN BLOOD: 2 MM/HR (ref 0–20)
GLOBULIN SER CALC-MCNC: 3.4 G/DL (ref 2.3–3.5)
GLUCOSE SERPL-MCNC: 89 MG/DL (ref 70–99)
HCT VFR BLD AUTO: 46.1 % (ref 35.8–46.3)
HGB BLD-MCNC: 14.6 G/DL (ref 11.7–15.4)
IMM GRANULOCYTES # BLD AUTO: 0.08 K/UL (ref 0–0.5)
IMM GRANULOCYTES NFR BLD AUTO: 1.2 % (ref 0–5)
LYMPHOCYTES # BLD: 1.97 K/UL (ref 0.5–4.6)
LYMPHOCYTES NFR BLD: 30.1 % (ref 13–44)
MCH RBC QN AUTO: 28 PG (ref 26.1–32.9)
MCHC RBC AUTO-ENTMCNC: 31.7 G/DL (ref 31.4–35)
MCV RBC AUTO: 88.3 FL (ref 82–102)
MONOCYTES # BLD: 0.57 K/UL (ref 0.1–1.3)
MONOCYTES NFR BLD: 8.7 % (ref 4–12)
NEUTS SEG # BLD: 3.72 K/UL (ref 1.7–8.2)
NEUTS SEG NFR BLD: 56.9 % (ref 43–78)
NRBC # BLD: 0 K/UL (ref 0–0.2)
PLATELET # BLD AUTO: 422 K/UL (ref 150–450)
PMV BLD AUTO: 9.3 FL (ref 9.4–12.3)
POTASSIUM SERPL-SCNC: 4.5 MMOL/L (ref 3.5–5.1)
PROT SERPL-MCNC: 7.1 G/DL (ref 6.3–8.2)
RBC # BLD AUTO: 5.22 M/UL (ref 4.05–5.2)
SODIUM SERPL-SCNC: 140 MMOL/L (ref 136–145)
URATE SERPL-MCNC: 4.7 MG/DL (ref 2.5–7.1)
WBC # BLD AUTO: 6.5 K/UL (ref 4.3–11.1)

## 2025-05-16 PROCEDURE — 99214 OFFICE O/P EST MOD 30 MIN: CPT | Performed by: NURSE PRACTITIONER

## 2025-05-16 RX ORDER — FLUTICASONE PROPIONATE 50 MCG
SPRAY, SUSPENSION (ML) NASAL
COMMUNITY
Start: 2025-05-11

## 2025-05-16 NOTE — PROGRESS NOTES
Orthopaedic Hand Surgery Note    Name: Dominga Britt  YOB: 1990  Gender: female  MRN: [id number]    CC: Follow up of bilateral hand pain, numbness, and tingling    HPI: Patient is a 34 y.o. female who is here regarding follow up for bilateral hand pain, numbness, and tingling.  She is here to discuss her nerve conduction studies as well as discuss options.  She reports that over the last couple of months her symptoms are worsening.  She also reports multiple joint pain including bilateral elbows, bilateral wrist, bilateral knees.  She has a family history of autoimmune/inflammatory with her sister.  She notes morning stiffness.  She was last seen February 28, 2024.  New nerve conduction studies were ordered.    2/28/25 Patient is a 33 y.o. female with a chief complaint of bilateral hand numbness and tingling in the median and ulnar nerve distribution. The symptoms have been going on for several years. The patient does complain of night wakening and increased symptoms with driving. Evaluation to date has included PCP.  She had nerve conduction studies done by Dr. Wilson in April 2021 that showed mild left carpal tunnel syndrome.  It was recommended for her to follow-up in 18 to 24 months. Treatment to date has included none.  She works at a [employer] center as well as [employer].  She does heavy lifting, pushing, pulling, repetitive work.  She notes her mother and sister both had carpal tunnel syndrome resulting in surgery.  She complains of multiple joint pain.  She complains of morning stiffness.     ROS/Meds/PSH/PMH/FH/SH: I personally reviewed the patients standard intake form.  Pertinents are discussed in the HPI    Physical Examination:  Musculoskeletal:     Examination of the bilateral upper extremity demonstrates Decreased sensation to light touch in the median distribution, normal sensation in ulnar and radial distribution, Positive carpal tunnel compression testing and Phalen

## 2025-05-17 LAB — ANA SER QL: NEGATIVE

## 2025-05-19 LAB
CCP IGA+IGG SERPL IA-ACNC: 10 UNITS (ref 0–19)
RHEUMATOID FACT SER QL LA: NEGATIVE

## 2025-05-20 DIAGNOSIS — G56.03 BILATERAL CARPAL TUNNEL SYNDROME: Primary | ICD-10-CM

## 2025-05-21 LAB — HLA-B27 QL NAA+PROBE: NEGATIVE

## 2025-05-28 ASSESSMENT — PATIENT HEALTH QUESTIONNAIRE - PHQ9
4. FEELING TIRED OR HAVING LITTLE ENERGY: NEARLY EVERY DAY
SUM OF ALL RESPONSES TO PHQ9 QUESTIONS 1 & 2: 3
10. IF YOU CHECKED OFF ANY PROBLEMS, HOW DIFFICULT HAVE THESE PROBLEMS MADE IT FOR YOU TO DO YOUR WORK, TAKE CARE OF THINGS AT HOME, OR GET ALONG WITH OTHER PEOPLE: VERY DIFFICULT
SUM OF ALL RESPONSES TO PHQ QUESTIONS 1-9: 9
1. LITTLE INTEREST OR PLEASURE IN DOING THINGS: SEVERAL DAYS
2. FEELING DOWN, DEPRESSED OR HOPELESS: MORE THAN HALF THE DAYS
1. LITTLE INTEREST OR PLEASURE IN DOING THINGS: SEVERAL DAYS
3. TROUBLE FALLING OR STAYING ASLEEP: SEVERAL DAYS
9. THOUGHTS THAT YOU WOULD BE BETTER OFF DEAD, OR OF HURTING YOURSELF: NOT AT ALL
6. FEELING BAD ABOUT YOURSELF - OR THAT YOU ARE A FAILURE OR HAVE LET YOURSELF OR YOUR FAMILY DOWN: SEVERAL DAYS
8. MOVING OR SPEAKING SO SLOWLY THAT OTHER PEOPLE COULD HAVE NOTICED. OR THE OPPOSITE - BEING SO FIDGETY OR RESTLESS THAT YOU HAVE BEEN MOVING AROUND A LOT MORE THAN USUAL: NOT AT ALL
8. MOVING OR SPEAKING SO SLOWLY THAT OTHER PEOPLE COULD HAVE NOTICED. OR THE OPPOSITE, BEING SO FIGETY OR RESTLESS THAT YOU HAVE BEEN MOVING AROUND A LOT MORE THAN USUAL: NOT AT ALL
5. POOR APPETITE OR OVEREATING: SEVERAL DAYS
7. TROUBLE CONCENTRATING ON THINGS, SUCH AS READING THE NEWSPAPER OR WATCHING TELEVISION: NOT AT ALL
7. TROUBLE CONCENTRATING ON THINGS, SUCH AS READING THE NEWSPAPER OR WATCHING TELEVISION: NOT AT ALL
2. FEELING DOWN, DEPRESSED OR HOPELESS: MORE THAN HALF THE DAYS
3. TROUBLE FALLING OR STAYING ASLEEP: SEVERAL DAYS
5. POOR APPETITE OR OVEREATING: SEVERAL DAYS
9. THOUGHTS THAT YOU WOULD BE BETTER OFF DEAD, OR OF HURTING YOURSELF: NOT AT ALL
SUM OF ALL RESPONSES TO PHQ QUESTIONS 1-9: 9
SUM OF ALL RESPONSES TO PHQ QUESTIONS 1-9: 9
4. FEELING TIRED OR HAVING LITTLE ENERGY: NEARLY EVERY DAY
10. IF YOU CHECKED OFF ANY PROBLEMS, HOW DIFFICULT HAVE THESE PROBLEMS MADE IT FOR YOU TO DO YOUR WORK, TAKE CARE OF THINGS AT HOME, OR GET ALONG WITH OTHER PEOPLE: VERY DIFFICULT
SUM OF ALL RESPONSES TO PHQ QUESTIONS 1-9: 9
6. FEELING BAD ABOUT YOURSELF - OR THAT YOU ARE A FAILURE OR HAVE LET YOURSELF OR YOUR FAMILY DOWN: SEVERAL DAYS
SUM OF ALL RESPONSES TO PHQ QUESTIONS 1-9: 9

## 2025-06-04 ENCOUNTER — TELEPHONE (OUTPATIENT)
Dept: ORTHOPEDIC SURGERY | Age: 35
End: 2025-06-04

## 2025-06-04 ENCOUNTER — OFFICE VISIT (OUTPATIENT)
Dept: FAMILY MEDICINE CLINIC | Facility: CLINIC | Age: 35
End: 2025-06-04
Payer: COMMERCIAL

## 2025-06-04 ENCOUNTER — HOSPITAL ENCOUNTER (OUTPATIENT)
Dept: GENERAL RADIOLOGY | Age: 35
Discharge: HOME | End: 2025-06-06
Payer: COMMERCIAL

## 2025-06-04 VITALS
WEIGHT: 241.4 LBS | TEMPERATURE: 97.9 F | BODY MASS INDEX: 38.8 KG/M2 | HEIGHT: 66 IN | HEART RATE: 74 BPM | SYSTOLIC BLOOD PRESSURE: 138 MMHG | OXYGEN SATURATION: 94 % | DIASTOLIC BLOOD PRESSURE: 82 MMHG

## 2025-06-04 DIAGNOSIS — M25.551 BILATERAL HIP PAIN: ICD-10-CM

## 2025-06-04 DIAGNOSIS — E55.9 VITAMIN D INSUFFICIENCY: ICD-10-CM

## 2025-06-04 DIAGNOSIS — R71.8 ELEVATED RED BLOOD CELL COUNT: ICD-10-CM

## 2025-06-04 DIAGNOSIS — Z00.01 ENCOUNTER FOR WELL ADULT EXAM WITH ABNORMAL FINDINGS: ICD-10-CM

## 2025-06-04 DIAGNOSIS — R06.83 SNORING: ICD-10-CM

## 2025-06-04 DIAGNOSIS — M25.552 BILATERAL HIP PAIN: ICD-10-CM

## 2025-06-04 DIAGNOSIS — I10 ESSENTIAL HYPERTENSION: ICD-10-CM

## 2025-06-04 DIAGNOSIS — Z00.01 ENCOUNTER FOR WELL ADULT EXAM WITH ABNORMAL FINDINGS: Primary | ICD-10-CM

## 2025-06-04 LAB
25(OH)D3 SERPL-MCNC: 39.7 NG/ML (ref 30–100)
CHOLEST SERPL-MCNC: 187 MG/DL (ref 0–200)
CREAT UR-MCNC: 230 MG/DL (ref 28–217)
EST. AVERAGE GLUCOSE BLD GHB EST-MCNC: 131 MG/DL
HBA1C MFR BLD: 6.2 % (ref 0–5.6)
HDLC SERPL-MCNC: 31 MG/DL (ref 40–60)
HDLC SERPL: 6.1 (ref 0–5)
LDLC SERPL CALC-MCNC: 137 MG/DL (ref 0–100)
MICROALBUMIN UR-MCNC: 1.54 MG/DL (ref 0–20)
MICROALBUMIN/CREAT UR-RTO: 7 MG/G (ref 0–30)
TRIGL SERPL-MCNC: 98 MG/DL (ref 0–150)
TSH W FREE THYROID IF ABNORMAL: 1.56 UIU/ML (ref 0.27–4.2)
VLDLC SERPL CALC-MCNC: 20 MG/DL (ref 6–23)

## 2025-06-04 PROCEDURE — 99395 PREV VISIT EST AGE 18-39: CPT

## 2025-06-04 PROCEDURE — 3079F DIAST BP 80-89 MM HG: CPT

## 2025-06-04 PROCEDURE — 73523 X-RAY EXAM HIPS BI 5/> VIEWS: CPT

## 2025-06-04 PROCEDURE — 99214 OFFICE O/P EST MOD 30 MIN: CPT

## 2025-06-04 PROCEDURE — 3075F SYST BP GE 130 - 139MM HG: CPT

## 2025-06-04 RX ORDER — AMLODIPINE BESYLATE 5 MG/1
5 TABLET ORAL DAILY
Qty: 90 TABLET | Refills: 3 | Status: SHIPPED | OUTPATIENT
Start: 2025-06-04

## 2025-06-04 SDOH — ECONOMIC STABILITY: FOOD INSECURITY: WITHIN THE PAST 12 MONTHS, THE FOOD YOU BOUGHT JUST DIDN'T LAST AND YOU DIDN'T HAVE MONEY TO GET MORE.: NEVER TRUE

## 2025-06-04 SDOH — ECONOMIC STABILITY: FOOD INSECURITY: WITHIN THE PAST 12 MONTHS, YOU WORRIED THAT YOUR FOOD WOULD RUN OUT BEFORE YOU GOT MONEY TO BUY MORE.: NEVER TRUE

## 2025-06-04 ASSESSMENT — ENCOUNTER SYMPTOMS
COUGH: 0
VOICE CHANGE: 0
SINUS PAIN: 0
NAUSEA: 0
EYE DISCHARGE: 0
DIARRHEA: 0
CHEST TIGHTNESS: 0
FACIAL SWELLING: 0
SHORTNESS OF BREATH: 0
VOMITING: 0

## 2025-06-04 NOTE — PATIENT INSTRUCTIONS
hands, brush your teeth twice a day, and wear a seat belt in the car.   Where can you learn more?  Go to [email or web address].net/patientEd and enter P072 to learn more about \"Well Visit, Ages 18 to 65: Care Instructions.\"  Current as of: April 30, 2024  Content Version: 14.5  © 0045-8464 [employer].   Care instructions adapted under license by [employer]. If you have questions about a medical condition or this instruction, always ask your healthcare professional. [employer] disclaims any warranty or liability for your use of this information.

## 2025-06-04 NOTE — PROGRESS NOTES
FE 24) 1-20 MG-MCG(24) TABS Take 1 tablet by mouth daily Continuous cycle  Patient not taking: Reported on 2025  Alissa Ryan, APRN - CNP     Past Medical History:   Diagnosis Date    Abnormal Pap smear of cervix 2018    HPV +    Anemia     Arrhythmia Birth    Born with a heart murmur    Arthritis     Since I was a young child, my legs have always hurt    Carpal tunnel syndrome     Chronic pain     Diabetes (HCC) 2014    patient denies being diabetic or pre-diabetic 23----------------I was diagnosed with being a borderline diabetic    GERD (gastroesophageal reflux disease)     I have severe acid reflux; treated with medication    Heart murmur     born with it- sometimes has been heard as an adult- Stress ECHO 10/7/22- EF of 60 - 65%. Denies dizziness, CP, or SOB. No changes in daily activity tolerance    HSV infection     Valtrex PRN    Hypertension 2022    per pt is well controlled with medication    Infertility, female 2020    PCOS    Irritable bowel syndrome with constipation     Obesity     Overactive bladder     PCOS (polycystic ovarian syndrome)     Prediabetes     patient denies being diabetic or pre-diabetic 23     Past Surgical History:   Procedure Laterality Date    COLONOSCOPY  2017    hemorrhoids    KIDNEY STONE REMOVAL  10/2023    UPPER GASTROINTESTINAL ENDOSCOPY  2017    WISDOM TOOTH EXTRACTION       Family History   Problem Relation Age of Onset    Hypertension Mother     Asthma Mother     Colon Cancer Mother 62        Early stage caught in 10/2016. 12 inches of infected colon resected and been cancer free since    Cancer Mother         10/05/2016 colon cancer. Had 12 inches of cancerous colon cut out    Osteoarthritis Mother     Stroke Mother     Alcohol Abuse Father     Hypertension Brother     Diabetes Brother     Ovarian Cancer Maternal Grandmother     Cancer Maternal Grandmother          from a heart attack caused by complications of lung

## 2025-06-09 ENCOUNTER — RESULTS FOLLOW-UP (OUTPATIENT)
Dept: FAMILY MEDICINE CLINIC | Facility: CLINIC | Age: 35
End: 2025-06-09

## 2025-06-09 ENCOUNTER — TELEPHONE (OUTPATIENT)
Dept: ORTHOPEDIC SURGERY | Age: 35
End: 2025-06-09

## 2025-06-21 DIAGNOSIS — I10 ESSENTIAL HYPERTENSION: ICD-10-CM

## 2025-06-23 RX ORDER — AMLODIPINE BESYLATE 5 MG/1
5 TABLET ORAL DAILY
Qty: 90 TABLET | Refills: 3 | OUTPATIENT
Start: 2025-06-23

## 2025-08-04 ENCOUNTER — TELEPHONE (OUTPATIENT)
Dept: OBGYN CLINIC | Age: 35
End: 2025-08-04

## 2025-08-25 ENCOUNTER — OFFICE VISIT (OUTPATIENT)
Dept: FAMILY MEDICINE CLINIC | Facility: CLINIC | Age: 35
End: 2025-08-25
Payer: COMMERCIAL

## 2025-08-25 ENCOUNTER — HOSPITAL ENCOUNTER (OUTPATIENT)
Dept: GENERAL RADIOLOGY | Age: 35
Discharge: HOME | End: 2025-08-27
Payer: COMMERCIAL

## 2025-08-25 VITALS
BODY MASS INDEX: 38.12 KG/M2 | OXYGEN SATURATION: 93 % | TEMPERATURE: 98.7 F | WEIGHT: 237.2 LBS | DIASTOLIC BLOOD PRESSURE: 74 MMHG | SYSTOLIC BLOOD PRESSURE: 125 MMHG | HEIGHT: 66 IN | HEART RATE: 72 BPM

## 2025-08-25 DIAGNOSIS — M25.561 CHRONIC PAIN OF BOTH KNEES: Chronic | ICD-10-CM

## 2025-08-25 DIAGNOSIS — G89.29 CHRONIC PAIN OF BOTH KNEES: Chronic | ICD-10-CM

## 2025-08-25 DIAGNOSIS — M25.562 CHRONIC PAIN OF BOTH KNEES: ICD-10-CM

## 2025-08-25 DIAGNOSIS — G89.29 CHRONIC PAIN OF BOTH KNEES: ICD-10-CM

## 2025-08-25 DIAGNOSIS — M25.562 CHRONIC PAIN OF BOTH KNEES: Chronic | ICD-10-CM

## 2025-08-25 DIAGNOSIS — M25.551 BILATERAL HIP PAIN: Primary | Chronic | ICD-10-CM

## 2025-08-25 DIAGNOSIS — M25.561 CHRONIC PAIN OF BOTH KNEES: ICD-10-CM

## 2025-08-25 DIAGNOSIS — M25.552 BILATERAL HIP PAIN: Primary | Chronic | ICD-10-CM

## 2025-08-25 PROBLEM — M45.9 ANKYLOSING SPONDYLITIS (HCC): Status: RESOLVED | Noted: 2025-05-16 | Resolved: 2025-08-25

## 2025-08-25 PROCEDURE — 73562 X-RAY EXAM OF KNEE 3: CPT

## 2025-08-25 PROCEDURE — 3074F SYST BP LT 130 MM HG: CPT

## 2025-08-25 PROCEDURE — 99214 OFFICE O/P EST MOD 30 MIN: CPT

## 2025-08-25 PROCEDURE — 3078F DIAST BP <80 MM HG: CPT

## 2025-08-25 RX ORDER — MELOXICAM 7.5 MG/1
7.5 TABLET ORAL 2 TIMES DAILY
Qty: 60 TABLET | Refills: 2 | Status: SHIPPED | OUTPATIENT
Start: 2025-08-25 | End: 2025-11-23

## 2025-08-25 ASSESSMENT — ENCOUNTER SYMPTOMS
DIARRHEA: 0
COUGH: 0
CHEST TIGHTNESS: 0
VOMITING: 0
SHORTNESS OF BREATH: 0
NAUSEA: 0